# Patient Record
Sex: FEMALE | Race: WHITE | Employment: UNEMPLOYED | URBAN - METROPOLITAN AREA
[De-identification: names, ages, dates, MRNs, and addresses within clinical notes are randomized per-mention and may not be internally consistent; named-entity substitution may affect disease eponyms.]

---

## 2017-05-16 ENCOUNTER — HOSPITAL ENCOUNTER (EMERGENCY)
Age: 52
Discharge: HOME | End: 2017-05-16
Attending: EMERGENCY MEDICINE
Payer: COMMERCIAL

## 2017-05-16 ENCOUNTER — APPOINTMENT (OUTPATIENT)
Dept: CT IMAGING | Age: 52
End: 2017-05-16
Payer: COMMERCIAL

## 2017-05-16 VITALS
HEIGHT: 68 IN | BODY MASS INDEX: 37.89 KG/M2 | HEART RATE: 90 BPM | DIASTOLIC BLOOD PRESSURE: 90 MMHG | RESPIRATION RATE: 17 BRPM | WEIGHT: 250 LBS | TEMPERATURE: 97.7 F | OXYGEN SATURATION: 100 % | SYSTOLIC BLOOD PRESSURE: 140 MMHG

## 2017-05-16 DIAGNOSIS — S89.91XA RIGHT KNEE INJURY, INITIAL ENCOUNTER: Primary | ICD-10-CM

## 2017-05-16 PROCEDURE — 6360000002 HC RX W HCPCS: Performed by: EMERGENCY MEDICINE

## 2017-05-16 PROCEDURE — 73700 CT LOWER EXTREMITY W/O DYE: CPT

## 2017-05-16 PROCEDURE — 93971 EXTREMITY STUDY: CPT

## 2017-05-16 PROCEDURE — 99283 EMERGENCY DEPT VISIT LOW MDM: CPT

## 2017-05-16 PROCEDURE — 96372 THER/PROPH/DIAG INJ SC/IM: CPT

## 2017-05-16 RX ORDER — MORPHINE SULFATE 4 MG/ML
4 INJECTION, SOLUTION INTRAMUSCULAR; INTRAVENOUS ONCE
Status: COMPLETED | OUTPATIENT
Start: 2017-05-16 | End: 2017-05-16

## 2017-05-16 RX ORDER — BUPROPION HYDROCHLORIDE 75 MG/1
75 TABLET ORAL 2 TIMES DAILY
COMMUNITY
End: 2021-10-13

## 2017-05-16 RX ORDER — OXYCODONE HYDROCHLORIDE AND ACETAMINOPHEN 5; 325 MG/1; MG/1
1-2 TABLET ORAL EVERY 6 HOURS PRN
Qty: 11 TABLET | Refills: 0 | Status: SHIPPED | OUTPATIENT
Start: 2017-05-16 | End: 2017-05-23

## 2017-05-16 RX ORDER — FLUTICASONE PROPIONATE 50 MCG
1 SPRAY, SUSPENSION (ML) NASAL DAILY
COMMUNITY
End: 2021-10-13

## 2017-05-16 RX ORDER — BUSPIRONE HYDROCHLORIDE 10 MG/1
10 TABLET ORAL 3 TIMES DAILY
COMMUNITY
End: 2021-10-13

## 2017-05-16 RX ADMIN — MORPHINE SULFATE 4 MG: 4 INJECTION, SOLUTION INTRAMUSCULAR; INTRAVENOUS at 20:11

## 2017-05-16 RX ADMIN — MORPHINE SULFATE 4 MG: 4 INJECTION, SOLUTION INTRAMUSCULAR; INTRAVENOUS at 16:53

## 2017-05-16 ASSESSMENT — PAIN DESCRIPTION - FREQUENCY: FREQUENCY: CONTINUOUS

## 2017-05-16 ASSESSMENT — PAIN DESCRIPTION - LOCATION: LOCATION: LEG

## 2017-05-16 ASSESSMENT — PAIN DESCRIPTION - DESCRIPTORS: DESCRIPTORS: CONSTANT

## 2017-05-16 ASSESSMENT — PAIN SCALES - GENERAL
PAINLEVEL_OUTOF10: 8
PAINLEVEL_OUTOF10: 9
PAINLEVEL_OUTOF10: 10

## 2017-05-16 ASSESSMENT — PAIN DESCRIPTION - ORIENTATION: ORIENTATION: RIGHT

## 2017-05-16 ASSESSMENT — PAIN DESCRIPTION - PAIN TYPE: TYPE: ACUTE PAIN;CHRONIC PAIN

## 2017-06-01 ASSESSMENT — ENCOUNTER SYMPTOMS
BLOOD IN STOOL: 0
DIARRHEA: 0
VOMITING: 0
COLOR CHANGE: 1
SORE THROAT: 0
NAUSEA: 0
RHINORRHEA: 0
SHORTNESS OF BREATH: 0
COUGH: 0
CONSTIPATION: 0
ABDOMINAL PAIN: 0

## 2020-06-26 ENCOUNTER — HOSPITAL ENCOUNTER (EMERGENCY)
Age: 55
Discharge: HOME | End: 2020-06-26
Attending: EMERGENCY MEDICINE
Payer: OTHER MISCELLANEOUS

## 2020-06-26 ENCOUNTER — APPOINTMENT (OUTPATIENT)
Dept: CT IMAGING | Age: 55
End: 2020-06-26
Payer: OTHER MISCELLANEOUS

## 2020-06-26 ENCOUNTER — APPOINTMENT (OUTPATIENT)
Dept: GENERAL RADIOLOGY | Age: 55
End: 2020-06-26
Payer: OTHER MISCELLANEOUS

## 2020-06-26 VITALS
HEART RATE: 86 BPM | SYSTOLIC BLOOD PRESSURE: 140 MMHG | RESPIRATION RATE: 15 BRPM | DIASTOLIC BLOOD PRESSURE: 86 MMHG | OXYGEN SATURATION: 96 % | TEMPERATURE: 98 F

## 2020-06-26 LAB
ANION GAP SERPL CALCULATED.3IONS-SCNC: 13 MMOL/L (ref 9–17)
BUN BLDV-MCNC: 19 MG/DL (ref 6–20)
BUN/CREAT BLD: ABNORMAL (ref 9–20)
CALCIUM SERPL-MCNC: 8.7 MG/DL (ref 8.6–10.4)
CHLORIDE BLD-SCNC: 104 MMOL/L (ref 98–107)
CO2: 27 MMOL/L (ref 20–31)
CREAT SERPL-MCNC: 0.86 MG/DL (ref 0.5–0.9)
GFR AFRICAN AMERICAN: >60 ML/MIN
GFR NON-AFRICAN AMERICAN: >60 ML/MIN
GFR SERPL CREATININE-BSD FRML MDRD: ABNORMAL ML/MIN/{1.73_M2}
GFR SERPL CREATININE-BSD FRML MDRD: ABNORMAL ML/MIN/{1.73_M2}
GLUCOSE BLD-MCNC: 133 MG/DL (ref 70–99)
POTASSIUM SERPL-SCNC: 4 MMOL/L (ref 3.7–5.3)
SODIUM BLD-SCNC: 144 MMOL/L (ref 135–144)

## 2020-06-26 PROCEDURE — 72125 CT NECK SPINE W/O DYE: CPT

## 2020-06-26 PROCEDURE — 99285 EMERGENCY DEPT VISIT HI MDM: CPT

## 2020-06-26 PROCEDURE — 96375 TX/PRO/DX INJ NEW DRUG ADDON: CPT

## 2020-06-26 PROCEDURE — 80048 BASIC METABOLIC PNL TOTAL CA: CPT

## 2020-06-26 PROCEDURE — 71045 X-RAY EXAM CHEST 1 VIEW: CPT

## 2020-06-26 PROCEDURE — 73562 X-RAY EXAM OF KNEE 3: CPT

## 2020-06-26 PROCEDURE — 72128 CT CHEST SPINE W/O DYE: CPT

## 2020-06-26 PROCEDURE — 72131 CT LUMBAR SPINE W/O DYE: CPT

## 2020-06-26 PROCEDURE — 6370000000 HC RX 637 (ALT 250 FOR IP): Performed by: STUDENT IN AN ORGANIZED HEALTH CARE EDUCATION/TRAINING PROGRAM

## 2020-06-26 PROCEDURE — 70450 CT HEAD/BRAIN W/O DYE: CPT

## 2020-06-26 PROCEDURE — 96374 THER/PROPH/DIAG INJ IV PUSH: CPT

## 2020-06-26 PROCEDURE — 6360000002 HC RX W HCPCS: Performed by: STUDENT IN AN ORGANIZED HEALTH CARE EDUCATION/TRAINING PROGRAM

## 2020-06-26 RX ORDER — CYCLOBENZAPRINE HCL 10 MG
10 TABLET ORAL 3 TIMES DAILY PRN
Qty: 21 TABLET | Refills: 0 | Status: SHIPPED | OUTPATIENT
Start: 2020-06-26 | End: 2020-07-06

## 2020-06-26 RX ORDER — FENTANYL CITRATE 50 UG/ML
50 INJECTION, SOLUTION INTRAMUSCULAR; INTRAVENOUS ONCE
Status: COMPLETED | OUTPATIENT
Start: 2020-06-26 | End: 2020-06-26

## 2020-06-26 RX ORDER — ONDANSETRON 2 MG/ML
4 INJECTION INTRAMUSCULAR; INTRAVENOUS ONCE
Status: COMPLETED | OUTPATIENT
Start: 2020-06-26 | End: 2020-06-26

## 2020-06-26 RX ORDER — IBUPROFEN 600 MG/1
600 TABLET ORAL EVERY 6 HOURS PRN
Qty: 15 TABLET | Refills: 0 | Status: SHIPPED | OUTPATIENT
Start: 2020-06-26 | End: 2021-10-13

## 2020-06-26 RX ORDER — LIDOCAINE 50 MG/G
1 PATCH TOPICAL DAILY
Qty: 30 PATCH | Refills: 0 | Status: SHIPPED | OUTPATIENT
Start: 2020-06-26 | End: 2021-10-13

## 2020-06-26 RX ORDER — KETOROLAC TROMETHAMINE 15 MG/ML
15 INJECTION, SOLUTION INTRAMUSCULAR; INTRAVENOUS ONCE
Status: COMPLETED | OUTPATIENT
Start: 2020-06-26 | End: 2020-06-26

## 2020-06-26 RX ORDER — ACETAMINOPHEN 500 MG
1000 TABLET ORAL EVERY 6 HOURS PRN
Qty: 30 TABLET | Refills: 0 | Status: SHIPPED | OUTPATIENT
Start: 2020-06-26 | End: 2021-10-13

## 2020-06-26 RX ORDER — CYCLOBENZAPRINE HCL 10 MG
10 TABLET ORAL ONCE
Status: COMPLETED | OUTPATIENT
Start: 2020-06-26 | End: 2020-06-26

## 2020-06-26 RX ADMIN — ONDANSETRON 4 MG: 2 INJECTION INTRAMUSCULAR; INTRAVENOUS at 16:27

## 2020-06-26 RX ADMIN — KETOROLAC TROMETHAMINE 15 MG: 15 INJECTION, SOLUTION INTRAMUSCULAR; INTRAVENOUS at 17:16

## 2020-06-26 RX ADMIN — FENTANYL CITRATE 50 MCG: 50 INJECTION INTRAMUSCULAR; INTRAVENOUS at 16:28

## 2020-06-26 RX ADMIN — CYCLOBENZAPRINE 10 MG: 10 TABLET, FILM COATED ORAL at 18:28

## 2020-06-26 ASSESSMENT — PAIN SCALES - GENERAL
PAINLEVEL_OUTOF10: 10
PAINLEVEL_OUTOF10: 9

## 2020-06-26 ASSESSMENT — PAIN DESCRIPTION - ORIENTATION: ORIENTATION: LEFT;LOWER

## 2020-06-26 ASSESSMENT — PAIN DESCRIPTION - LOCATION: LOCATION: BACK;KNEE

## 2020-06-26 ASSESSMENT — PAIN DESCRIPTION - PAIN TYPE: TYPE: ACUTE PAIN;CHRONIC PAIN
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
## 2020-06-26 NOTE — ED NOTES
Pt. Reports a decrease of pain. Dr. Judy Alejandra and resident at bedside. Pt resting on stretcher, no respiratory distress noted, pt updated on plan of care, will continue to monitor, call light in reach.        Sushil Grimm RN  06/26/20 Sigifredo Paulino RN  06/26/20 [id number]

## 2020-06-26 NOTE — ED NOTES
Bed: 17  Expected date:   Expected time:   Means of arrival:   Comments:  [facility] [place],[address], RN  06/26/20 [id number]

## 2020-06-27 ASSESSMENT — ENCOUNTER SYMPTOMS
CHEST TIGHTNESS: 0
BACK PAIN: 1
SHORTNESS OF BREATH: 0
EYE DISCHARGE: 0
ABDOMINAL PAIN: 0
EYE REDNESS: 0
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
## 2021-10-12 ENCOUNTER — HOSPITAL ENCOUNTER (INPATIENT)
Age: 56
LOS: 3 days | Discharge: HOME | DRG: 305 | End: 2021-10-15
Attending: EMERGENCY MEDICINE | Admitting: STUDENT IN AN ORGANIZED HEALTH CARE EDUCATION/TRAINING PROGRAM
Payer: MEDICARE

## 2021-10-12 ENCOUNTER — APPOINTMENT (OUTPATIENT)
Dept: CT IMAGING | Age: 56
DRG: 305 | End: 2021-10-12
Payer: MEDICARE

## 2021-10-12 ENCOUNTER — APPOINTMENT (OUTPATIENT)
Dept: GENERAL RADIOLOGY | Age: 56
DRG: 305 | End: 2021-10-12
Payer: MEDICARE

## 2021-10-12 DIAGNOSIS — I16.0 HYPERTENSIVE URGENCY: ICD-10-CM

## 2021-10-12 DIAGNOSIS — R41.82 ALTERED MENTAL STATUS, UNSPECIFIED ALTERED MENTAL STATUS TYPE: Primary | ICD-10-CM

## 2021-10-12 DIAGNOSIS — G93.9 BRAIN LESION: ICD-10-CM

## 2021-10-12 LAB
ABSOLUTE EOS #: 0.04 K/UL (ref 0–0.44)
ABSOLUTE IMMATURE GRANULOCYTE: 0.11 K/UL (ref 0–0.3)
ABSOLUTE LYMPH #: 0.74 K/UL (ref 1.1–3.7)
ABSOLUTE MONO #: 0.38 K/UL (ref 0.1–1.2)
ALBUMIN SERPL-MCNC: 5.2 G/DL (ref 3.5–5.2)
ALBUMIN/GLOBULIN RATIO: 1.8 (ref 1–2.5)
ALP BLD-CCNC: 83 U/L (ref 35–104)
ALT SERPL-CCNC: 39 U/L (ref 5–33)
AMPHETAMINE SCREEN URINE: NEGATIVE
ANION GAP SERPL CALCULATED.3IONS-SCNC: 19 MMOL/L (ref 9–17)
AST SERPL-CCNC: 27 U/L
BARBITURATE SCREEN URINE: NEGATIVE
BASOPHILS # BLD: 0 % (ref 0–2)
BASOPHILS ABSOLUTE: 0.03 K/UL (ref 0–0.2)
BENZODIAZEPINE SCREEN, URINE: NEGATIVE
BILIRUB SERPL-MCNC: 0.61 MG/DL (ref 0.3–1.2)
BUN BLDV-MCNC: 14 MG/DL (ref 6–20)
BUN/CREAT BLD: ABNORMAL (ref 9–20)
BUPRENORPHINE URINE: ABNORMAL
CALCIUM SERPL-MCNC: 9.9 MG/DL (ref 8.6–10.4)
CANNABINOID SCREEN URINE: NEGATIVE
CHLORIDE BLD-SCNC: 97 MMOL/L (ref 98–107)
CO2: 21 MMOL/L (ref 20–31)
COCAINE METABOLITE, URINE: NEGATIVE
CREAT SERPL-MCNC: 0.84 MG/DL (ref 0.5–0.9)
DIFFERENTIAL TYPE: ABNORMAL
EOSINOPHILS RELATIVE PERCENT: 0 % (ref 1–4)
GFR AFRICAN AMERICAN: >60 ML/MIN
GFR NON-AFRICAN AMERICAN: >60 ML/MIN
GFR SERPL CREATININE-BSD FRML MDRD: ABNORMAL ML/MIN/{1.73_M2}
GFR SERPL CREATININE-BSD FRML MDRD: ABNORMAL ML/MIN/{1.73_M2}
GLUCOSE BLD-MCNC: 197 MG/DL (ref 70–99)
HCT VFR BLD CALC: 42.4 % (ref 36.3–47.1)
HEMOGLOBIN: 13.7 G/DL (ref 11.9–15.1)
IMMATURE GRANULOCYTES: 1 %
LYMPHOCYTES # BLD: 6 % (ref 24–43)
MCH RBC QN AUTO: 28.2 PG (ref 25.2–33.5)
MCHC RBC AUTO-ENTMCNC: 32.3 G/DL (ref 28.4–34.8)
MCV RBC AUTO: 87.4 FL (ref 82.6–102.9)
MDMA URINE: ABNORMAL
METHADONE SCREEN, URINE: NEGATIVE
METHAMPHETAMINE, URINE: ABNORMAL
MONOCYTES # BLD: 3 % (ref 3–12)
NRBC AUTOMATED: 0 PER 100 WBC
OPIATES, URINE: NEGATIVE
OXYCODONE SCREEN URINE: POSITIVE
PDW BLD-RTO: 13.5 % (ref 11.8–14.4)
PHENCYCLIDINE, URINE: NEGATIVE
PLATELET # BLD: 286 K/UL (ref 138–453)
PLATELET ESTIMATE: ABNORMAL
PMV BLD AUTO: 8.6 FL (ref 8.1–13.5)
POTASSIUM SERPL-SCNC: 3.9 MMOL/L (ref 3.7–5.3)
PROLACTIN: 8.76 UG/L (ref 4.79–23.3)
PROPOXYPHENE, URINE: ABNORMAL
RBC # BLD: 4.85 M/UL (ref 3.95–5.11)
RBC # BLD: ABNORMAL 10*6/UL
SEG NEUTROPHILS: 90 % (ref 36–65)
SEGMENTED NEUTROPHILS ABSOLUTE COUNT: 10.29 K/UL (ref 1.5–8.1)
SODIUM BLD-SCNC: 137 MMOL/L (ref 135–144)
TEST INFORMATION: ABNORMAL
TOTAL PROTEIN: 8.1 G/DL (ref 6.4–8.3)
TRICYCLIC ANTIDEPRESSANTS, UR: ABNORMAL
WBC # BLD: 11.6 K/UL (ref 3.5–11.3)
WBC # BLD: ABNORMAL 10*3/UL

## 2021-10-12 PROCEDURE — 2500000003 HC RX 250 WO HCPCS: Performed by: STUDENT IN AN ORGANIZED HEALTH CARE EDUCATION/TRAINING PROGRAM

## 2021-10-12 PROCEDURE — 71045 X-RAY EXAM CHEST 1 VIEW: CPT

## 2021-10-12 PROCEDURE — 70450 CT HEAD/BRAIN W/O DYE: CPT

## 2021-10-12 PROCEDURE — 84146 ASSAY OF PROLACTIN: CPT

## 2021-10-12 PROCEDURE — 80307 DRUG TEST PRSMV CHEM ANLYZR: CPT

## 2021-10-12 PROCEDURE — 93005 ELECTROCARDIOGRAM TRACING: CPT | Performed by: STUDENT IN AN ORGANIZED HEALTH CARE EDUCATION/TRAINING PROGRAM

## 2021-10-12 PROCEDURE — 2060000000 HC ICU INTERMEDIATE R&B

## 2021-10-12 PROCEDURE — 96365 THER/PROPH/DIAG IV INF INIT: CPT

## 2021-10-12 PROCEDURE — 96361 HYDRATE IV INFUSION ADD-ON: CPT

## 2021-10-12 PROCEDURE — 2580000003 HC RX 258: Performed by: STUDENT IN AN ORGANIZED HEALTH CARE EDUCATION/TRAINING PROGRAM

## 2021-10-12 PROCEDURE — 73630 X-RAY EXAM OF FOOT: CPT

## 2021-10-12 PROCEDURE — 80053 COMPREHEN METABOLIC PANEL: CPT

## 2021-10-12 PROCEDURE — 99283 EMERGENCY DEPT VISIT LOW MDM: CPT

## 2021-10-12 PROCEDURE — 85025 COMPLETE CBC W/AUTO DIFF WBC: CPT

## 2021-10-12 RX ORDER — 0.9 % SODIUM CHLORIDE 0.9 %
1000 INTRAVENOUS SOLUTION INTRAVENOUS ONCE
Status: COMPLETED | OUTPATIENT
Start: 2021-10-12 | End: 2021-10-12

## 2021-10-12 RX ORDER — NICARDIPINE HYDROCHLORIDE 0.1 MG/ML
3-15 INJECTION INTRAVENOUS CONTINUOUS
Status: DISCONTINUED | OUTPATIENT
Start: 2021-10-12 | End: 2021-10-14

## 2021-10-12 RX ADMIN — NICARDIPINE HYDROCHLORIDE 3 MG/HR: 0.1 INJECTION INTRAVENOUS at 21:57

## 2021-10-12 RX ADMIN — SODIUM CHLORIDE 1000 ML: 9 INJECTION, SOLUTION INTRAVENOUS at 19:59

## 2021-10-12 ASSESSMENT — ENCOUNTER SYMPTOMS
NAUSEA: 0
RHINORRHEA: 0
VOMITING: 0
SHORTNESS OF BREATH: 0
ABDOMINAL PAIN: 0
SORE THROAT: 0
DIARRHEA: 0

## 2021-10-12 NOTE — ED PROVIDER NOTES
[facility] ED  Emergency Department Encounter  EmergencyMedicine Resident     Pt Orlando Lewis  MRN: [id number]  Josiasgfbrandie 1965  Date of evaluation: 10/12/21  PCP:  Denise Horan MD    This patient was evaluated in the Emergency Department for symptoms described in the history of present illness. The patient was evaluated in the context of the global COVID-19 pandemic, which necessitated consideration that the patient might be at risk for infection with the SARS-CoV-2 virus that causes COVID-19. Institutional protocols and algorithms that pertain to the evaluation of patients at risk for COVID-19 are in a state of rapid change based on information released by regulatory bodies including the CDC and federal and state organizations. These policies and algorithms were followed during the patient's care in the ED. CHIEF COMPLAINT       Chief Complaint   Patient presents with    Altered Mental Status     LKW 1 hour pta       HISTORY OF PRESENT ILLNESS  (Location/Symptom, Timing/Onset, Context/Setting, Quality, Duration, Modifying Factors, Severity.)      Colton De León is a 55 y.o. female who presents with acute altered mental status concerning for possible seizure. Patient last known well was 6:30 PM today patient's  went to the store came back and she was found altered evidence of tongue biting with some dried blood in oral cavity. Patient does not recall what happened no history of seizures EMS was called hypertensive to the 190s over 100s this rhythm stable but postictal during transfer. No history of seizures. Patient was seen yesterday for elevated blood pressure and headache at an outside facility emergency department. Recently had a surgical procedure to the right great toe she does not recall exactly what the procedure was.     PAST MEDICAL / SURGICAL / SOCIAL / FAMILY HISTORY      has a past medical history of Asthma, Borderline diabetes, Depression, Fibromyalgia, Herpes simplex without mention of complication, and Sleep apnea. has a past surgical history that includes Cholecystectomy (1990); Tonsillectomy; Tubal ligation (1992); back surgery (Cage and Screws); Foot Tendon Surgery; Dilation & curettage (11/14/11); hysteroscopy (11/14/11); kanu and bso (cervix removed) (12/29/11); Hysterectomy (12/29/11); Enterocele repair (12/29/11); Abdominal adhesion surgery (12/29/11); and back surgery (2/24/12). Social History     Socioeconomic History    Marital status:      Spouse name: Not on file    Number of children: Not on file    Years of education: Not on file    Highest education level: Not on file   Occupational History    Not on file   Tobacco Use    Smoking status: Never Smoker    Smokeless tobacco: Never Used   Substance and Sexual Activity    Alcohol use: Yes     Comment: Rare    Drug use: No    Sexual activity: Not Currently     Partners: Male   Other Topics Concern    Not on file   Social History Narrative    Not on file     Social Determinants of Health     Financial Resource Strain:     Difficulty of Paying Living Expenses:    Food Insecurity:     Worried About Running Out of Food in the Last Year:     [address] in the Last Year:    Transportation Needs:     Lack of Transportation (Medical):      Lack of Transportation (Non-Medical):    Physical Activity:     Days of Exercise per Week:     Minutes of Exercise per Session:    Stress:     Feeling of Stress :    Social Connections:     Frequency of Communication with Friends and Family:     Frequency of Social Gatherings with Friends and Family:     Attends Hinduism Services:     Active Member of Clubs or Organizations:     Attends Club or Organization Meetings:     Marital Status:    Intimate Partner Violence:     Fear of Current or Ex-Partner:     Emotionally Abused:     Physically Abused:     Sexually Abused:        Family History   Problem Relation Age of Onset    Depression Mother     ADHD Neg Hx     Alcohol Abuse Neg Hx     Allergic Rhinitis Neg Hx     Allergies Neg Hx     Allergy (Severe) Neg Hx     Alzheimer's Disease Neg Hx     Amblyopia Neg Hx     Anemia Neg Hx     Anesth Problems Neg Hx     Angioedema Neg Hx     Anxiety Disorder Neg Hx     Arrhythmia Neg Hx     Arthritis Neg Hx     Asthma Neg Hx     Ataxia Neg Hx     Atopy Neg Hx     Bipolar Disorder Neg Hx     Birth Defects Neg Hx     Bleeding Prob Neg Hx     Blindness Neg Hx     Brain Cancer Neg Hx     BRCA 1/2 Neg Hx     Breast Cancer Neg Hx     Broken Bones Neg Hx     Cancer Neg Hx     Cataracts Neg Hx     Celiac Disease Neg Hx     Cervical Cancer Neg Hx     Chdhd Hrt Surg Neg Hx     Chdhd Resp Dis Neg Hx     Chorea Neg Hx     Chronic Infections Neg Hx     Cirrhosis Neg Hx     Clotting Disorder Neg Hx     Collagen Disease Neg Hx     Colon Cancer Neg Hx     Colon Polyps Neg Hx     COPD Neg Hx     Coronary Art Dis Neg Hx     Cowden Syndrome Neg Hx     Crohn's Disease Neg Hx     Cystic Fibrosis Neg Hx     Dementia Neg Hx     JONATHAN Usage Neg Hx     Diabetes Neg Hx     Dislocations Neg Hx     Down Syndrome Neg Hx     Drug Abuse Neg Hx     Early Death Neg Hx     Eclampsia Neg Hx     Eczema Neg Hx     Elevated Lipids Neg Hx     Emphysema Neg Hx     Endometrial Cancer Neg Hx     Esophageal Cancer Neg Hx     Fainting Neg Hx     Glaucoma Neg Hx      Problems Neg Hx     Hearing Loss Neg Hx     Heart Attack Neg Hx     Heart Defect Neg Hx     Heart Disease Neg Hx     Heart Failure Neg Hx     Heart Surgery Neg Hx     Hemochromatosis Neg Hx     High Blood Pressure Neg Hx     High Cholesterol Neg Hx     Hypertension Neg Hx     Hypotension Neg Hx     Immunodeficiency Neg Hx     Infertility Neg Hx     Inflam Bowel Dis Neg Hx     Irritable Bowel Syndrome Neg Hx     Kidney Cancer Neg Hx     Kidney Disease Neg Hx     Learning Disabilities Neg Hx     Li-Fraumeni Syndrome Neg Hx     Liver Cancer Neg Hx     Liver Disease Neg Hx     Lung Cancer Neg Hx     Lupus Neg Hx     Macular Degen Neg Hx     Malig Hyperten Neg Hx     Malig Hypertherm Neg Hx     Marfan Syndrome Neg Hx     Memory Loss Neg Hx     Mental Illness Neg Hx     Mental Retardation Neg Hx     Migraines Neg Hx     Miscarriages / Stillbirths Neg Hx     Mult Sclerosis Neg Hx     Neurofibromatosis Neg Hx     Neuropathy Neg Hx     Obesity Neg Hx     OCD Neg Hx     Osteoarthritis Neg Hx     Osteoporosis Neg Hx     Other Neg Hx     Ovarian Cancer Neg Hx     Pacemaker Neg Hx     Paranoid Behavior Neg Hx     Parkinsonism Neg Hx     Physical Abuse Neg Hx     PKU Neg Hx     Premature Birth Neg Hx      Labor Neg Hx     Prostate Cancer Neg Hx     Pseudochol. Deficiency Neg Hx     Psoriasis Neg Hx     Rashes/Skin Problems Neg Hx     Rectal Cancer Neg Hx     Retinal Detachment Neg Hx     Rheum Arthritis Neg Hx     Rheumatologic Disease Neg Hx     Schizophrenia Neg Hx     Scoliosis Neg Hx     Seizures Neg Hx     Severe Sprains Neg Hx     Sexual Abuse Neg Hx     Sickle Cell Anemia Neg Hx     Sickle Cell Trait Neg Hx     SIDS Neg Hx     Spont Abortions Neg Hx     Stillborn Neg Hx     Stomach Cancer Neg Hx     Strabismus Neg Hx     Stroke Neg Hx     Substance Abuse Neg Hx     Sudden Death Neg Hx     Thyroid Cancer Neg Hx     Thyroid Disease Neg Hx     Tuberculosis Neg Hx     Ulcerative Colitis Neg Hx     Urolithiasis Neg Hx     Urticaria Neg Hx     Uterine Cancer Neg Hx     Vaginal Cancer Neg Hx     Vision Loss Neg Hx     Farooq's Disease Neg Hx        Allergies:  Bactrim, Demerol, Lyrica [pregabalin], and Pcn [penicillins]    Home Medications:  Prior to Admission medications    Medication Sig Start Date End Date Taking?  Authorizing Provider   acetaminophen (APAP EXTRA STRENGTH) 500 MG tablet Take 2 tablets by mouth every 6 hours as needed for Pain 6/26/20   Jeremy Huang DO   ibuprofen (ADVIL;MOTRIN) 600 MG tablet Take 1 tablet by mouth every 6 hours as needed for Pain 6/26/20   Jeremy Huang DO   lidocaine (LIDODERM) 5 % Place 1 patch onto the skin daily 12 hours on, 12 hours off. 6/26/20   Jeremy Huang DO   buPROPion (WELLBUTRIN) 75 MG tablet Take 75 mg by mouth 2 times daily    Historical Provider, MD   busPIRone (BUSPAR) 10 MG tablet Take 10 mg by mouth 3 times daily    Historical Provider, MD   fluticasone (FLONASE) 50 MCG/ACT nasal spray 1 spray by Nasal route daily    Historical Provider, MD   fluoxetine (PROZAC) 20 MG capsule Take 20 mg by mouth daily. 11/23/11   Historical Provider, MD   lisinopril (PRINIVIL;ZESTRIL) 20 MG tablet Take 20 mg by mouth daily. Historical Provider, MD   Albuterol Sulfate (PROAIR HFA IN) Inhale 2 puffs into the lungs as needed. Historical Provider, MD       REVIEW OF SYSTEMS    (2-9 systems for level 4, 10 or more for level 5)      Review of Systems   Constitutional: Negative for fever. HENT: Negative for congestion, rhinorrhea and sore throat. Eyes: Negative for visual disturbance. Respiratory: Negative for shortness of breath. Cardiovascular: Negative for chest pain. Gastrointestinal: Negative for abdominal pain, diarrhea, nausea and vomiting. Genitourinary: Negative for difficulty urinating, dysuria and flank pain. Musculoskeletal: Negative for neck pain. Skin: Positive for wound. Allergic/Immunologic: Negative for food allergies. Neurological: Positive for seizures. Negative for numbness and headaches. Psychiatric/Behavioral: Positive for confusion. PHYSICAL EXAM   (up to 7 for level 4, 8 or more for level 5)      INITIAL VITALS:   BP (!) 191/113   Pulse 80   Temp 98.1 °F (36.7 °C) (Oral)   Resp 22   LMP 11/29/2011   SpO2 93%     Physical Exam  Vitals and nursing note reviewed. Constitutional:       Appearance: She is well-developed. She is obese.  She is not toxic-appearing or diaphoretic. HENT:      Head: Normocephalic. Right Ear: External ear normal.      Left Ear: External ear normal.      Nose: Nose normal.      Mouth/Throat:      Mouth: Mucous membranes are moist.   Eyes:      General: No scleral icterus. Extraocular Movements: Extraocular movements intact. Pupils: Pupils are equal, round, and reactive to light. Cardiovascular:      Rate and Rhythm: Normal rate. Pulses: Normal pulses. Pulmonary:      Effort: Pulmonary effort is normal. No respiratory distress. Abdominal:      Palpations: Abdomen is soft. Musculoskeletal:         General: Tenderness and signs of injury present. Normal range of motion. Cervical back: Normal range of motion and neck supple. Comments: Right great toe   Skin:     General: Skin is warm. Capillary Refill: Capillary refill takes less than 2 seconds. Neurological:      General: No focal deficit present. Mental Status: She is alert.    Psychiatric:         Mood and Affect: Mood normal.         DIFFERENTIAL  DIAGNOSIS     PLAN (LABS / IMAGING / EKG):  Orders Placed This Encounter   Procedures    XR CHEST PORTABLE    CT HEAD WO CONTRAST    XR FOOT RIGHT (MIN 3 VIEWS)    CBC WITH AUTO DIFFERENTIAL    COMPREHENSIVE METABOLIC PANEL    PROLACTIN    Urine Drug Screen    Inpatient consult to Neurocritical care    Inpatient consult to Neurology    Inpatient consult to Hospitalist    EKG 12 Lead       MEDICATIONS ORDERED:  Orders Placed This Encounter   Medications    0.9 % sodium chloride bolus    niCARdipine (CARDENE) 20 mg in 0.9 % sodium chloride 200 mL solution       DDX: seizure, htn encephalopathy    DIAGNOSTIC RESULTS / EMERGENCY DEPARTMENT COURSE / MDM   LAB RESULTS:  Results for orders placed or performed during the hospital encounter of 10/12/21   CBC WITH AUTO DIFFERENTIAL   Result Value Ref Range    WBC 11.6 (H) 3.5 - 11.3 k/uL    RBC 4.85 3.95 - 5.11 m/uL    Hemoglobin 13.7 11.9 - 15.1 g/dL    Hematocrit 42.4 36.3 - 47.1 %    MCV 87.4 82.6 - 102.9 fL    MCH 28.2 25.2 - 33.5 pg    MCHC 32.3 28.4 - 34.8 g/dL    RDW 13.5 11.8 - 14.4 %    Platelets 642 830 - 673 k/uL    MPV 8.6 8.1 - 13.5 fL    NRBC Automated 0.0 0.0 per 100 WBC    Differential Type NOT REPORTED     Seg Neutrophils 90 (H) 36 - 65 %    Lymphocytes 6 (L) 24 - 43 %    Monocytes 3 3 - 12 %    Eosinophils % 0 (L) 1 - 4 %    Basophils 0 0 - 2 %    Immature Granulocytes 1 (H) 0 %    Segs Absolute 10.29 (H) 1.50 - 8.10 k/uL    Absolute Lymph # 0.74 (L) 1.10 - 3.70 k/uL    Absolute Mono # 0.38 0.10 - 1.20 k/uL    Absolute Eos # 0.04 0.00 - 0.44 k/uL    Basophils Absolute 0.03 0.00 - 0.20 k/uL    Absolute Immature Granulocyte 0.11 0.00 - 0.30 k/uL    WBC Morphology NOT REPORTED     RBC Morphology NOT REPORTED     Platelet Estimate NOT REPORTED    COMPREHENSIVE METABOLIC PANEL   Result Value Ref Range    Glucose 197 (H) 70 - 99 mg/dL    BUN 14 6 - 20 mg/dL    CREATININE 0.84 0.50 - 0.90 mg/dL    Bun/Cre Ratio NOT REPORTED 9 - 20    Calcium 9.9 8.6 - 10.4 mg/dL    Sodium 137 135 - 144 mmol/L    Potassium 3.9 3.7 - 5.3 mmol/L    Chloride 97 (L) 98 - 107 mmol/L    CO2 21 20 - 31 mmol/L    Anion Gap 19 (H) 9 - 17 mmol/L    Alkaline Phosphatase 83 35 - 104 U/L    ALT 39 (H) 5 - 33 U/L    AST 27 <32 U/L    Total Bilirubin 0.61 0.3 - 1.2 mg/dL    Total Protein 8.1 6.4 - 8.3 g/dL    Albumin 5.2 3.5 - 5.2 g/dL    Albumin/Globulin Ratio 1.8 1.0 - 2.5    GFR Non-African American >60 >60 mL/min    GFR African American >60 >60 mL/min    GFR Comment          GFR Staging NOT REPORTED    PROLACTIN   Result Value Ref Range    Prolactin 8.76 4.79 - 23.30 ug/L   Urine Drug Screen   Result Value Ref Range    Amphetamine Screen, Ur NEGATIVE NEGATIVE    Barbiturate Screen, Ur NEGATIVE NEGATIVE    Benzodiazepine Screen, Urine NEGATIVE NEGATIVE    Cocaine Metabolite, Urine NEGATIVE NEGATIVE    Methadone Screen, Urine NEGATIVE NEGATIVE    Opiates, Urine NEGATIVE NEGATIVE    Phencyclidine, Urine NEGATIVE NEGATIVE    Propoxyphene, Urine NOT REPORTED NEGATIVE    Cannabinoid Scrn, Ur NEGATIVE NEGATIVE    Oxycodone Screen, Ur POSITIVE (A) NEGATIVE    Methamphetamine, Urine NOT REPORTED NEGATIVE    Tricyclic Antidepressants, Urine NOT REPORTED NEGATIVE    MDMA, Urine NOT REPORTED NEGATIVE    Buprenorphine Urine NOT REPORTED NEGATIVE    Test Information       Assay provides medical screening only. The absence of expected drug(s) and/or metabolite(s) may indicate diluted or adulterated urine, limitations of testing or timing of collection. IMPRESSION: Is a post ictal obese 59-year-old female hypertensive history of seizures plan will be broad work-up including CT head x-ray right lower extremity    RADIOLOGY:  XR FOOT RIGHT (MIN 3 VIEWS)    Result Date: 10/12/2021  EXAMINATION: THREE X-RAY VIEWS OF THE RIGHT FOOT 10/12/2021 7:50 pm COMPARISON: None. HISTORY: ORDERING SYSTEM PROVIDED HISTORY: wound eval TECHNOLOGIST PROVIDED HISTORY: wound eval FINDINGS: There is evidence of previous surgery at the distal aspect of the right 1st metatarsal.  There is a suspected osteotomy with fixation screw noted, suggesting bunionectomy. However, the distal fragment of the osteotomy/1st metatarsal head is laterally displaced and dislocated relative to the base of the 1st proximal phalanx. No other acute fracture or dislocation. Joint spaces are preserved. Status post suspected bunionectomy with displaced distal osteotomy fragment; the 1st metatarsal head fragment is laterally dislocated relative to the 1st proximal phalangeal base.      CT HEAD WO CONTRAST    Result Date: 10/12/2021  EXAMINATION: CT OF THE HEAD WITHOUT CONTRAST  10/12/2021 8:41 pm TECHNIQUE: CT of the head was performed without the administration of intravenous contrast. Dose modulation, iterative reconstruction, and/or weight based adjustment of the mA/kV was utilized to reduce the radiation dose to as low as reasonably achievable. COMPARISON: CT head 06/26/2020 HISTORY: ORDERING SYSTEM PROVIDED HISTORY: new onset seizure TECHNOLOGIST PROVIDED HISTORY: new onset seizure Decision Support Exception - unselect if not a suspected or confirmed emergency medical condition->Emergency Medical Condition (MA) Is the patient pregnant?->No Reason for Exam: Altered Mental Status - New Onset Seizure. Recent Right foot surgery (1st Digit). Acuity: Unknown Type of Exam: Unknown FINDINGS: BRAIN/VENTRICLES: There is an ill-defined area of vague hypodensity seen in the left medial occipital lobe. This was not present on the prior study. There is no acute intracranial hemorrhage, significant mass effect or midline shift. No abnormal extra-axial fluid collection. There is no evidence of hydrocephalus. Atherosclerotic vascular calcifications of the vertebral arteries noted ORBITS: The visualized portion of the orbits demonstrate no acute abnormality. SINUSES: The visualized paranasal sinuses and mastoid air cells demonstrate no acute abnormality. SOFT TISSUES/SKULL:  No acute abnormality of the visualized skull or soft tissues. Ill-defined area of vague hypodensity noted in the left occipital lobe. Given the history of new onset seizure, evaluation with contrast-enhanced CT or MRI would be helpful. XR CHEST PORTABLE    Result Date: 10/12/2021  EXAMINATION: ONE XRAY VIEW OF THE CHEST 10/12/2021 7:50 pm COMPARISON: June 26, 2020 HISTORY: ORDERING SYSTEM PROVIDED HISTORY: Altered mental status TECHNOLOGIST PROVIDED HISTORY: ams Reason for Exam: port Upright FINDINGS: Cardiomediastinal silhouette within normal limits. Prominent interstitial markings similar to prior study. No focal consolidation. No definite effusion. No pneumothorax or subdiaphragmatic free air. No acute osseous abnormality identified. No radiographic evidence of acute cardiopulmonary disease.        EKG  sinus rhythm at a rate of 79 normal axis sinus arrhythmia normal R wave progression  respiration 80 MO interval 154. No st/t wave changes. All EKG's are interpreted by the Emergency Department Physician who either signs or Co-signs this chart in the absence of a cardiologist.    EMERGENCY DEPARTMENT COURSE:  ED Course as of Oct 12 2327   Tue Oct 12, 2021   2235 Case discussed with neurology resident as patient evaluated by neuro critical care resident and found not to be candidate for [facility] [place].    [BG]   [phone] for admission eeg, mri keppra load    [BG]   2310 Neuro residents and evaluated this patient recommending Inter[facility] admission with consultation to neurology. [BG]      ED Course User Index  [BG] Samantha Anand DO         PROCEDURES:    CONSULTS:  IP CONSULT TO NEUROCRITICAL CARE  IP CONSULT TO NEUROLOGY  IP CONSULT TO HOSPITALIST    CRITICAL CARE:      FINAL IMPRESSION      1. Altered mental status, unspecified altered mental status type    2. Hypertensive urgency          DISPOSITION / PLAN     DISPOSITION  admitted to intermed      PATIENT REFERRED TO:  No follow-up provider specified.     DISCHARGE MEDICATIONS:  New Prescriptions    No medications on file       Samantha Anand DO  Emergency Medicine Resident    (Please note that portions of thisnote were completed with a voice recognition program.  Efforts were made to edit the dictations but occasionally words are mis-transcribed.)       Samantha Anand DO  Resident  10/12/21 [id number]

## 2021-10-12 NOTE — ED NOTES
Bed: 30  Expected date:   Expected time:   Means of arrival:   Comments:  [address], RN  10/12/21 [id number]

## 2021-10-13 PROBLEM — E87.6 HYPOKALEMIA: Status: ACTIVE | Noted: 2021-10-13

## 2021-10-13 PROBLEM — R56.9 SEIZURE-LIKE ACTIVITY (HCC): Status: ACTIVE | Noted: 2021-10-13

## 2021-10-13 PROBLEM — I10 ESSENTIAL HYPERTENSION: Status: ACTIVE | Noted: 2021-10-13

## 2021-10-13 LAB
ALBUMIN SERPL-MCNC: 4.6 G/DL (ref 3.5–5.2)
ALBUMIN/GLOBULIN RATIO: 2.1 (ref 1–2.5)
ALP BLD-CCNC: 68 U/L (ref 35–104)
ALT SERPL-CCNC: 31 U/L (ref 5–33)
ANION GAP SERPL CALCULATED.3IONS-SCNC: 14 MMOL/L (ref 9–17)
AST SERPL-CCNC: 19 U/L
BILIRUB SERPL-MCNC: 0.49 MG/DL (ref 0.3–1.2)
BUN BLDV-MCNC: 14 MG/DL (ref 6–20)
BUN/CREAT BLD: ABNORMAL (ref 9–20)
CALCIUM SERPL-MCNC: 8.9 MG/DL (ref 8.6–10.4)
CHLORIDE BLD-SCNC: 98 MMOL/L (ref 98–107)
CO2: 25 MMOL/L (ref 20–31)
CREAT SERPL-MCNC: 0.8 MG/DL (ref 0.5–0.9)
EKG ATRIAL RATE: 79 BPM
EKG P AXIS: 50 DEGREES
EKG P-R INTERVAL: 154 MS
EKG Q-T INTERVAL: 398 MS
EKG QRS DURATION: 80 MS
EKG QTC CALCULATION (BAZETT): 456 MS
EKG R AXIS: -7 DEGREES
EKG T AXIS: 40 DEGREES
EKG VENTRICULAR RATE: 79 BPM
GFR AFRICAN AMERICAN: >60 ML/MIN
GFR NON-AFRICAN AMERICAN: >60 ML/MIN
GFR SERPL CREATININE-BSD FRML MDRD: ABNORMAL ML/MIN/{1.73_M2}
GFR SERPL CREATININE-BSD FRML MDRD: ABNORMAL ML/MIN/{1.73_M2}
GLUCOSE BLD-MCNC: 133 MG/DL (ref 70–99)
HCT VFR BLD CALC: 37.9 % (ref 36.3–47.1)
HEMOGLOBIN: 12 G/DL (ref 11.9–15.1)
INR BLD: 1.1
MAGNESIUM: 2.2 MG/DL (ref 1.6–2.6)
MCH RBC QN AUTO: 27.8 PG (ref 25.2–33.5)
MCHC RBC AUTO-ENTMCNC: 31.7 G/DL (ref 28.4–34.8)
MCV RBC AUTO: 87.9 FL (ref 82.6–102.9)
NRBC AUTOMATED: 0 PER 100 WBC
PDW BLD-RTO: 13.6 % (ref 11.8–14.4)
PLATELET # BLD: 243 K/UL (ref 138–453)
PMV BLD AUTO: 8.3 FL (ref 8.1–13.5)
POTASSIUM SERPL-SCNC: 3.5 MMOL/L (ref 3.7–5.3)
PROTHROMBIN TIME: 11.2 SEC (ref 9.1–12.3)
RBC # BLD: 4.31 M/UL (ref 3.95–5.11)
SARS-COV-2, RAPID: NOT DETECTED
SODIUM BLD-SCNC: 137 MMOL/L (ref 135–144)
SPECIMEN DESCRIPTION: NORMAL
TOTAL PROTEIN: 6.8 G/DL (ref 6.4–8.3)
WBC # BLD: 9.9 K/UL (ref 3.5–11.3)

## 2021-10-13 PROCEDURE — 6370000000 HC RX 637 (ALT 250 FOR IP): Performed by: INTERNAL MEDICINE

## 2021-10-13 PROCEDURE — 6360000002 HC RX W HCPCS: Performed by: NURSE PRACTITIONER

## 2021-10-13 PROCEDURE — 2060000000 HC ICU INTERMEDIATE R&B

## 2021-10-13 PROCEDURE — 6370000000 HC RX 637 (ALT 250 FOR IP): Performed by: NURSE PRACTITIONER

## 2021-10-13 PROCEDURE — 83735 ASSAY OF MAGNESIUM: CPT

## 2021-10-13 PROCEDURE — 2500000003 HC RX 250 WO HCPCS: Performed by: NURSE PRACTITIONER

## 2021-10-13 PROCEDURE — 99223 1ST HOSP IP/OBS HIGH 75: CPT | Performed by: INTERNAL MEDICINE

## 2021-10-13 PROCEDURE — 87635 SARS-COV-2 COVID-19 AMP PRB: CPT

## 2021-10-13 PROCEDURE — 2580000003 HC RX 258: Performed by: NURSE PRACTITIONER

## 2021-10-13 PROCEDURE — 36415 COLL VENOUS BLD VENIPUNCTURE: CPT

## 2021-10-13 PROCEDURE — 85027 COMPLETE CBC AUTOMATED: CPT

## 2021-10-13 PROCEDURE — 2580000003 HC RX 258: Performed by: STUDENT IN AN ORGANIZED HEALTH CARE EDUCATION/TRAINING PROGRAM

## 2021-10-13 PROCEDURE — 6360000002 HC RX W HCPCS: Performed by: STUDENT IN AN ORGANIZED HEALTH CARE EDUCATION/TRAINING PROGRAM

## 2021-10-13 PROCEDURE — 80053 COMPREHEN METABOLIC PANEL: CPT

## 2021-10-13 PROCEDURE — 85610 PROTHROMBIN TIME: CPT

## 2021-10-13 RX ORDER — SODIUM CHLORIDE 9 MG/ML
INJECTION, SOLUTION INTRAVENOUS CONTINUOUS
Status: DISCONTINUED | OUTPATIENT
Start: 2021-10-13 | End: 2021-10-13

## 2021-10-13 RX ORDER — TRAZODONE HYDROCHLORIDE 100 MG/1
100 TABLET ORAL NIGHTLY
Status: DISCONTINUED | OUTPATIENT
Start: 2021-10-13 | End: 2021-10-15 | Stop reason: HOSPADM

## 2021-10-13 RX ORDER — LAMOTRIGINE 150 MG/1
300 TABLET ORAL DAILY
COMMUNITY

## 2021-10-13 RX ORDER — POLYETHYLENE GLYCOL 3350 17 G/17G
17 POWDER, FOR SOLUTION ORAL DAILY PRN
Status: DISCONTINUED | OUTPATIENT
Start: 2021-10-13 | End: 2021-10-15 | Stop reason: HOSPADM

## 2021-10-13 RX ORDER — LISINOPRIL AND HYDROCHLOROTHIAZIDE 25; 20 MG/1; MG/1
1 TABLET ORAL DAILY
Status: DISCONTINUED | OUTPATIENT
Start: 2021-10-13 | End: 2021-10-15 | Stop reason: HOSPADM

## 2021-10-13 RX ORDER — HYDROCODONE BITARTRATE AND ACETAMINOPHEN 5; 325 MG/1; MG/1
1 TABLET ORAL ONCE
Status: COMPLETED | OUTPATIENT
Start: 2021-10-13 | End: 2021-10-13

## 2021-10-13 RX ORDER — LAMOTRIGINE 100 MG/1
300 TABLET ORAL DAILY
Status: DISCONTINUED | OUTPATIENT
Start: 2021-10-13 | End: 2021-10-15 | Stop reason: HOSPADM

## 2021-10-13 RX ORDER — ACETAMINOPHEN 325 MG/1
650 TABLET ORAL EVERY 6 HOURS PRN
Status: DISCONTINUED | OUTPATIENT
Start: 2021-10-13 | End: 2021-10-15 | Stop reason: HOSPADM

## 2021-10-13 RX ORDER — ACETAMINOPHEN 650 MG/1
650 SUPPOSITORY RECTAL EVERY 6 HOURS PRN
Status: DISCONTINUED | OUTPATIENT
Start: 2021-10-13 | End: 2021-10-15 | Stop reason: HOSPADM

## 2021-10-13 RX ORDER — LISINOPRIL AND HYDROCHLOROTHIAZIDE 25; 20 MG/1; MG/1
1 TABLET ORAL DAILY
COMMUNITY

## 2021-10-13 RX ORDER — LISINOPRIL 10 MG/1
20 TABLET ORAL DAILY
Status: DISCONTINUED | OUTPATIENT
Start: 2021-10-13 | End: 2021-10-13

## 2021-10-13 RX ORDER — CETIRIZINE HYDROCHLORIDE 10 MG/1
10 TABLET ORAL DAILY
Status: DISCONTINUED | OUTPATIENT
Start: 2021-10-13 | End: 2021-10-15 | Stop reason: HOSPADM

## 2021-10-13 RX ORDER — LORAZEPAM 2 MG/ML
2 INJECTION INTRAMUSCULAR EVERY 4 HOURS PRN
Status: DISCONTINUED | OUTPATIENT
Start: 2021-10-13 | End: 2021-10-13 | Stop reason: ALTCHOICE

## 2021-10-13 RX ORDER — SODIUM CHLORIDE 0.9 % (FLUSH) 0.9 %
5-40 SYRINGE (ML) INJECTION PRN
Status: DISCONTINUED | OUTPATIENT
Start: 2021-10-13 | End: 2021-10-15 | Stop reason: HOSPADM

## 2021-10-13 RX ORDER — SODIUM CHLORIDE 0.9 % (FLUSH) 0.9 %
5-40 SYRINGE (ML) INJECTION EVERY 12 HOURS SCHEDULED
Status: DISCONTINUED | OUTPATIENT
Start: 2021-10-13 | End: 2021-10-15 | Stop reason: HOSPADM

## 2021-10-13 RX ORDER — HYDROCODONE BITARTRATE AND ACETAMINOPHEN 5; 325 MG/1; MG/1
1 TABLET ORAL EVERY 4 HOURS PRN
Status: DISCONTINUED | OUTPATIENT
Start: 2021-10-13 | End: 2021-10-15 | Stop reason: HOSPADM

## 2021-10-13 RX ORDER — DULOXETIN HYDROCHLORIDE 30 MG/1
120 CAPSULE, DELAYED RELEASE ORAL EVERY EVENING
Status: DISCONTINUED | OUTPATIENT
Start: 2021-10-13 | End: 2021-10-15 | Stop reason: HOSPADM

## 2021-10-13 RX ORDER — LORATADINE 10 MG/1
10 TABLET ORAL DAILY
COMMUNITY

## 2021-10-13 RX ORDER — SODIUM CHLORIDE 9 MG/ML
25 INJECTION, SOLUTION INTRAVENOUS PRN
Status: DISCONTINUED | OUTPATIENT
Start: 2021-10-13 | End: 2021-10-15 | Stop reason: HOSPADM

## 2021-10-13 RX ORDER — BENZTROPINE MESYLATE 0.5 MG/1
0.5 TABLET ORAL 2 TIMES DAILY
COMMUNITY

## 2021-10-13 RX ORDER — TRAZODONE HYDROCHLORIDE 100 MG/1
100 TABLET ORAL NIGHTLY
COMMUNITY

## 2021-10-13 RX ORDER — PROPRANOLOL HYDROCHLORIDE 10 MG/1
10 TABLET ORAL 2 TIMES DAILY
Status: DISCONTINUED | OUTPATIENT
Start: 2021-10-13 | End: 2021-10-15 | Stop reason: HOSPADM

## 2021-10-13 RX ORDER — ALLOPURINOL 300 MG/1
300 TABLET ORAL DAILY
Status: DISCONTINUED | OUTPATIENT
Start: 2021-10-13 | End: 2021-10-15 | Stop reason: HOSPADM

## 2021-10-13 RX ORDER — DULOXETIN HYDROCHLORIDE 60 MG/1
120 CAPSULE, DELAYED RELEASE ORAL EVERY EVENING
COMMUNITY

## 2021-10-13 RX ORDER — ALLOPURINOL 300 MG/1
300 TABLET ORAL DAILY
COMMUNITY

## 2021-10-13 RX ORDER — BENZTROPINE MESYLATE 0.5 MG/1
0.5 TABLET ORAL 2 TIMES DAILY
Status: DISCONTINUED | OUTPATIENT
Start: 2021-10-13 | End: 2021-10-15 | Stop reason: HOSPADM

## 2021-10-13 RX ORDER — ONDANSETRON 2 MG/ML
4 INJECTION INTRAMUSCULAR; INTRAVENOUS EVERY 6 HOURS PRN
Status: DISCONTINUED | OUTPATIENT
Start: 2021-10-13 | End: 2021-10-15 | Stop reason: HOSPADM

## 2021-10-13 RX ORDER — OXYCODONE HYDROCHLORIDE 5 MG/1
5 TABLET ORAL EVERY 4 HOURS PRN
Status: ON HOLD | COMMUNITY
End: 2021-11-19

## 2021-10-13 RX ORDER — LORAZEPAM 2 MG/ML
2 INJECTION INTRAMUSCULAR EVERY 4 HOURS PRN
Status: DISCONTINUED | OUTPATIENT
Start: 2021-10-13 | End: 2021-10-15 | Stop reason: HOSPADM

## 2021-10-13 RX ORDER — ESCITALOPRAM OXALATE 20 MG/1
20 TABLET ORAL DAILY
COMMUNITY

## 2021-10-13 RX ORDER — PROPRANOLOL HYDROCHLORIDE 10 MG/1
10 TABLET ORAL 2 TIMES DAILY
COMMUNITY

## 2021-10-13 RX ORDER — ONDANSETRON 4 MG/1
4 TABLET, ORALLY DISINTEGRATING ORAL EVERY 8 HOURS PRN
Status: DISCONTINUED | OUTPATIENT
Start: 2021-10-13 | End: 2021-10-15 | Stop reason: HOSPADM

## 2021-10-13 RX ORDER — ESCITALOPRAM OXALATE 10 MG/1
20 TABLET ORAL DAILY
Status: DISCONTINUED | OUTPATIENT
Start: 2021-10-13 | End: 2021-10-15 | Stop reason: HOSPADM

## 2021-10-13 RX ORDER — MORPHINE SULFATE 4 MG/ML
1 INJECTION, SOLUTION INTRAMUSCULAR; INTRAVENOUS ONCE
Status: COMPLETED | OUTPATIENT
Start: 2021-10-13 | End: 2021-10-13

## 2021-10-13 RX ADMIN — LEVETIRACETAM 2000 MG: 100 INJECTION, SOLUTION INTRAVENOUS at 01:29

## 2021-10-13 RX ADMIN — ENOXAPARIN SODIUM 40 MG: 40 INJECTION SUBCUTANEOUS at 08:40

## 2021-10-13 RX ADMIN — SODIUM CHLORIDE, PRESERVATIVE FREE 10 ML: 5 INJECTION INTRAVENOUS at 20:04

## 2021-10-13 RX ADMIN — NICARDIPINE HYDROCHLORIDE 2 MG/HR: 0.1 INJECTION INTRAVENOUS at 08:36

## 2021-10-13 RX ADMIN — TRAZODONE HYDROCHLORIDE 100 MG: 100 TABLET ORAL at 20:13

## 2021-10-13 RX ADMIN — LISINOPRIL 20 MG: 10 TABLET ORAL at 08:40

## 2021-10-13 RX ADMIN — SODIUM CHLORIDE: 9 INJECTION, SOLUTION INTRAVENOUS at 02:17

## 2021-10-13 RX ADMIN — MORPHINE SULFATE 1 MG: 4 INJECTION INTRAVENOUS at 05:12

## 2021-10-13 RX ADMIN — PROPRANOLOL HYDROCHLORIDE 10 MG: 10 TABLET ORAL at 20:58

## 2021-10-13 RX ADMIN — HYDROCODONE BITARTRATE AND ACETAMINOPHEN 1 TABLET: 5; 325 TABLET ORAL at 20:14

## 2021-10-13 RX ADMIN — BENZTROPINE MESYLATE 0.5 MG: 0.5 TABLET ORAL at 20:14

## 2021-10-13 ASSESSMENT — PAIN DESCRIPTION - FREQUENCY: FREQUENCY: CONTINUOUS

## 2021-10-13 ASSESSMENT — PAIN SCALES - GENERAL
PAINLEVEL_OUTOF10: 10

## 2021-10-13 ASSESSMENT — PAIN DESCRIPTION - PAIN TYPE: TYPE: SURGICAL PAIN

## 2021-10-13 ASSESSMENT — PAIN DESCRIPTION - DESCRIPTORS: DESCRIPTORS: ACHING;CONSTANT;DISCOMFORT

## 2021-10-13 ASSESSMENT — PAIN DESCRIPTION - LOCATION: LOCATION: TOE (COMMENT WHICH ONE)

## 2021-10-13 NOTE — ED NOTES
Pt arrives to ED by EMS with CO altered mental status LKW 1hr PTA    NAD, resp even and non labored. speech clear and appropriate. A&Ox4 on arrival to ED. pt nonambulatory. side rails up x 2 call light within reach.        Keven Thomas RN  10/12/21 [id number]

## 2021-10-13 NOTE — H&P
[facility]  Office: [address], DO, Coancamaricel So, DO, Mohini Walsh, DO, Paxton Gonzalez Blood, DO, Tiffany Coronel MD, Priscilla Cristina MD, Jennifer Awan MD, Boy Pratt MD, Champ Wise MD, Maggie Saeed MD, Kaia Dougherty MD, Lorena Rosario MD, Kevan Brooke DO, Jayro Barragan DO, Ramya Avendaño MD,  Ismael Chino DO, Fabiola Nathan MD, Symone Quiles MD, Nan Guzamn MD, Madalyn Mora MD, Clement Hagen MD, Nicole Quintero MD, Ashley Bocanegra, [facility], [facility], CNP, Raza Ledbetter, CNP, Masha Godoy, CNS, Brandon Moreira, CNP, Maribell Godoy, CNP, Wilma Trevino, CNP, Chris Alejandre, [facility], Melany Palomares, [facility], Ramya Suarez, CNP, Rowan Oppenheim, PA-C, Luis Mock, [facility], Maxime Cobb, CNP, Mason Morel, CNP, Tran Beltran, CNP, Ana Sánchez, CNP, Michelle Milligan, CNP, Stewart Shah, CNP, George Brenner, [address]    HISTORY AND PHYSICAL EXAMINATION            Date:   10/13/2021  Patient name:  Nayeli Jones  Date of admission:  10/12/2021  7:39 PM  MRN:   [id number]  Account:  [de-identified]  YOB: 1965  PCP:    Lake Gusman MD  Room:   30/30  Code Status:    Full Code    Chief Complaint:     Chief Complaint   Patient presents with    Altered Mental Status     LKW 1 hour pta     History Obtained From:     patient, spouse, electronic medical record    History of Present Illness:     Nayeli Jones is a 55 y.o.  female who initially presented to [facility] on 10/12/2021 for evaluation of syncopal episode with questionable seizure-like activity. Patient's  had left the house to go to a convenience store. He believes that he was gone for less than 10 minutes. Prior to leaving, patient was awake and alert. After his return, the patient was unresponsive and with blood dripping from her mouth. There was questionable shaking as well.   Patient does not recall the event.  Her comorbidities include a history of degenerative disc disease of the lumbar spine status post pain pump implantation, fibromyalgia, depression, recent bunionectomy, and essential hypertension. EMS was called and patient was brought to the emergency department. Of note, the patient has been without her blood pressure medications for several days. Blood pressure at home was noted to be over 162 systolic. This was confirmed in the emergency department. CT of the head was performed which revealed an ill-defined area of a vague hypodensity in the left occipital lobe. Patient was a started on Cardene infusion. Past Medical History:     Past Medical History:   Diagnosis Date    Asthma     Borderline diabetes     Depression     2010    Fibromyalgia     Herpes simplex without mention of complication     Hypertensive urgency 10/12/2021    Sleep apnea 12/13/2011        Past Surgical History:     Past Surgical History:   Procedure Laterality Date    ABDOMINAL ADHESION SURGERY  12/29/11    BACK SURGERY  Cage and Screws    x's 3    BACK SURGERY  2/24/12    CHOLECYSTECTOMY  1990    DILATION [facility]TT[facility]11    [facility]    ENTEROCELE REPAIR  12/29/11    FOOT TENDON SURGERY      x's 2, Right Foot    HYSTERECTOMY  12/29/11    HYSTEROSCOPY  11/14/11    [facility]    PAULETTE AND BSO  12/29/11    [facility]    TONSILLECTOMY      as child    TUBAL LIGATION  1992        Medications Prior to Admission:     Prior to Admission medications    Medication Sig Start Date End Date Taking?  Authorizing Provider   acetaminophen (APAP EXTRA STRENGTH) 500 MG tablet Take 2 tablets by mouth every 6 hours as needed for Pain 6/26/20   Anneliese Aye, DO   ibuprofen (ADVIL;MOTRIN) 600 MG tablet Take 1 tablet by mouth every 6 hours as needed for Pain 6/26/20   Anneliese Aye, DO   lidocaine (LIDODERM) 5 % Place 1 patch onto the skin daily 12 hours on, 12 hours off. 6/26/20   Anneliese Aye, DO   buPROPion Crohn's Disease Neg Hx     Cystic Fibrosis Neg Hx     Dementia Neg Hx     JONATHAN Usage Neg Hx     Diabetes Neg Hx     Dislocations Neg Hx     Down Syndrome Neg Hx     Drug Abuse Neg Hx     Early Death Neg Hx     Eclampsia Neg Hx     Eczema Neg Hx     Elevated Lipids Neg Hx     Emphysema Neg Hx     Endometrial Cancer Neg Hx     Esophageal Cancer Neg Hx     Fainting Neg Hx     Glaucoma Neg Hx      Problems Neg Hx     Hearing Loss Neg Hx     Heart Attack Neg Hx     Heart Defect Neg Hx     Heart Disease Neg Hx     Heart Failure Neg Hx     Heart Surgery Neg Hx     Hemochromatosis Neg Hx     High Blood Pressure Neg Hx     High Cholesterol Neg Hx     Hypertension Neg Hx     Hypotension Neg Hx     Immunodeficiency Neg Hx     Infertility Neg Hx     Inflam Bowel Dis Neg Hx     Irritable Bowel Syndrome Neg Hx     Kidney Cancer Neg Hx     Kidney Disease Neg Hx     Learning Disabilities Neg Hx     Li-Fraumeni Syndrome Neg Hx     Liver Cancer Neg Hx     Liver Disease Neg Hx     Lung Cancer Neg Hx     Lupus Neg Hx     Macular Degen Neg Hx     Malig Hyperten Neg Hx     Malig Hypertherm Neg Hx     Marfan Syndrome Neg Hx     Memory Loss Neg Hx     Mental Illness Neg Hx     Mental Retardation Neg Hx     Migraines Neg Hx     Miscarriages / Stillbirths Neg Hx     Mult Sclerosis Neg Hx     Neurofibromatosis Neg Hx     Neuropathy Neg Hx     Obesity Neg Hx     OCD Neg Hx     Osteoarthritis Neg Hx     Osteoporosis Neg Hx     Other Neg Hx     Ovarian Cancer Neg Hx     Pacemaker Neg Hx     Paranoid Behavior Neg Hx     Parkinsonism Neg Hx     Physical Abuse Neg Hx     PKU Neg Hx     Premature Birth Neg Hx      Labor Neg Hx     Prostate Cancer Neg Hx     Pseudochol.  Deficiency Neg Hx     Psoriasis Neg Hx     Rashes/Skin Problems Neg Hx     Rectal Cancer Neg Hx     Retinal Detachment Neg Hx     Rheum Arthritis Neg Hx     Rheumatologic Disease Neg Hx     Schizophrenia Neg Hx     Scoliosis Neg Hx     Seizures Neg Hx     Severe Sprains Neg Hx     Sexual Abuse Neg Hx     Sickle Cell Anemia Neg Hx     Sickle Cell Trait Neg Hx     SIDS Neg Hx     Spont Abortions Neg Hx     Stillborn Neg Hx     Stomach Cancer Neg Hx     Strabismus Neg Hx     Stroke Neg Hx     Substance Abuse Neg Hx     Sudden Death Neg Hx     Thyroid Cancer Neg Hx     Thyroid Disease Neg Hx     Tuberculosis Neg Hx     Ulcerative Colitis Neg Hx     Urolithiasis Neg Hx     Urticaria Neg Hx     Uterine Cancer Neg Hx     Vaginal Cancer Neg Hx     Vision Loss Neg Hx     Farooq's Disease Neg Hx        Review of Systems:     Positive and Negative as described in HPI.     CONSTITUTIONAL:  negative for fevers, chills, sweats, fatigue, weight loss  HEENT:  negative for vision, hearing changes, runny nose, throat pain  RESPIRATORY:  negative for shortness of breath, cough, congestion, wheezing  CARDIOVASCULAR:  negative for chest pain, palpitations  GASTROINTESTINAL:  negative for nausea, vomiting, diarrhea, constipation, change in bowel habits, abdominal pain   GENITOURINARY:  negative for difficulty of urination, burning with urination, frequency   INTEGUMENT:  negative for rash, skin lesions, easy bruising   HEMATOLOGIC/LYMPHATIC:  negative for swelling/edema   ALLERGIC/IMMUNOLOGIC:  negative for urticaria , itching  ENDOCRINE:  negative increase in drinking, increase in urination, hot or cold intolerance  MUSCULOSKELETAL: Reports right foot pain; negative joint pains, muscle aches, swelling of joints  NEUROLOGICAL: Reports mild headache; negative for dizziness, lightheadedness, numbness, pain, tingling extremities  BEHAVIOR/PSYCH:  negative for depression, anxiety    Physical Exam:   BP (!) 173/89   Pulse 85   Temp 98.1 °F (36.7 °C) (Oral)   Resp 19   LMP 2011   SpO2 93%   Temp (24hrs), Av.1 °F (36.7 °C), Min:98.1 °F (36.7 °C), Max:98.1 °F (36.7 °C)    No results for input(s): POCGLU in the last 72 hours. Intake/Output Summary (Last 24 hours) at 10/13/2021 1504  Last data filed at 10/13/2021 1249  Gross per 24 hour   Intake 790 ml   Output 1050 ml   Net -260 ml       General Appearance: alert, well appearing, and in no acute distress, obese  female sitting up in bed. Mental status: oriented to person, place, and time  Head: normocephalic, atraumatic  Eye: no icterus, redness, pupils equal and reactive, extraocular eye movements intact, conjunctiva clear  Ear: normal external ear, no discharge, hearing intact  Nose: no drainage noted  Mouth: mucous membranes moist  Neck: supple, no carotid bruits, thyroid not palpable  Lungs: Bilateral equal air entry, clear to ausculation, no wheezing, rales or rhonchi, normal effort  Cardiovascular: normal rate, regular rhythm, no murmur, gallop, rub  Abdomen: Soft, nontender, nondistended, normal bowel sounds, no hepatomegaly or splenomegaly  Neurologic: There are no new focal motor or sensory deficits, normal muscle tone and bulk, no abnormal sensation, normal speech, cranial nerves II through XII grossly intact.  strength equal bilaterally.   Skin: No gross lesions, rashes, bruising or bleeding on exposed skin area  Extremities: peripheral pulses palpable, no pedal edema or calf pain with palpation, right foot bandaged  Psych: normal affect    Investigations:      Laboratory Testing:  Recent Results (from the past 24 hour(s))   EKG 12 Lead    Collection Time: 10/12/21  7:45 PM   Result Value Ref Range    Ventricular Rate 79 BPM    Atrial Rate 79 BPM    P-R Interval 154 ms    QRS Duration 80 ms    Q-T Interval 398 ms    QTc Calculation (Bazett) 456 ms    P Axis 50 degrees    R Axis -7 degrees    T Axis 40 degrees   CBC WITH AUTO DIFFERENTIAL    Collection Time: 10/12/21  8:00 PM   Result Value Ref Range    WBC 11.6 (H) 3.5 - 11.3 k/uL    RBC 4.85 3.95 - 5.11 m/uL    Hemoglobin 13.7 11.9 - 15.1 g/dL    Hematocrit 42.4 36.3 - 47.1 %    MCV 87.4 82.6 - 102.9 fL    MCH 28.2 25.2 - 33.5 pg    MCHC 32.3 28.4 - 34.8 g/dL    RDW 13.5 11.8 - 14.4 %    Platelets 990 266 - 341 k/uL    MPV 8.6 8.1 - 13.5 fL    NRBC Automated 0.0 0.0 per 100 WBC    Differential Type NOT REPORTED     Seg Neutrophils 90 (H) 36 - 65 %    Lymphocytes 6 (L) 24 - 43 %    Monocytes 3 3 - 12 %    Eosinophils % 0 (L) 1 - 4 %    Basophils 0 0 - 2 %    Immature Granulocytes 1 (H) 0 %    Segs Absolute 10.29 (H) 1.50 - 8.10 k/uL    Absolute Lymph # 0.74 (L) 1.10 - 3.70 k/uL    Absolute Mono # 0.38 0.10 - 1.20 k/uL    Absolute Eos # 0.04 0.00 - 0.44 k/uL    Basophils Absolute 0.03 0.00 - 0.20 k/uL    Absolute Immature Granulocyte 0.11 0.00 - 0.30 k/uL    WBC Morphology NOT REPORTED     RBC Morphology NOT REPORTED     Platelet Estimate NOT REPORTED    COMPREHENSIVE METABOLIC PANEL    Collection Time: 10/12/21  8:00 PM   Result Value Ref Range    Glucose 197 (H) 70 - 99 mg/dL    BUN 14 6 - 20 mg/dL    CREATININE 0.84 0.50 - 0.90 mg/dL    Bun/Cre Ratio NOT REPORTED 9 - 20    Calcium 9.9 8.6 - 10.4 mg/dL    Sodium 137 135 - 144 mmol/L    Potassium 3.9 3.7 - 5.3 mmol/L    Chloride 97 (L) 98 - 107 mmol/L    CO2 21 20 - 31 mmol/L    Anion Gap 19 (H) 9 - 17 mmol/L    Alkaline Phosphatase 83 35 - 104 U/L    ALT 39 (H) 5 - 33 U/L    AST 27 <32 U/L    Total Bilirubin 0.61 0.3 - 1.2 mg/dL    Total Protein 8.1 6.4 - 8.3 g/dL    Albumin 5.2 3.5 - 5.2 g/dL    Albumin/Globulin Ratio 1.8 1.0 - 2.5    GFR Non-African American >60 >60 mL/min    GFR African American >60 >60 mL/min    GFR Comment          GFR Staging NOT REPORTED    PROLACTIN    Collection Time: 10/12/21  8:00 PM   Result Value Ref Range    Prolactin 8.76 4.79 - 23.30 ug/L   Urine Drug Screen    Collection Time: 10/12/21  9:00 PM   Result Value Ref Range    Amphetamine Screen, Ur NEGATIVE NEGATIVE    Barbiturate Screen, Ur NEGATIVE NEGATIVE    Benzodiazepine Screen, Urine NEGATIVE NEGATIVE    Cocaine Metabolite, Urine NEGATIVE NEGATIVE    Methadone Screen, Urine NEGATIVE NEGATIVE    Opiates, Urine NEGATIVE NEGATIVE    Phencyclidine, Urine NEGATIVE NEGATIVE    Propoxyphene, Urine NOT REPORTED NEGATIVE    Cannabinoid Scrn, Ur NEGATIVE NEGATIVE    Oxycodone Screen, Ur POSITIVE (A) NEGATIVE    Methamphetamine, Urine NOT REPORTED NEGATIVE    Tricyclic Antidepressants, Urine NOT REPORTED NEGATIVE    MDMA, Urine NOT REPORTED NEGATIVE    Buprenorphine Urine NOT REPORTED NEGATIVE    Test Information       Assay provides medical screening only. The absence of expected drug(s) and/or metabolite(s) may indicate diluted or adulterated urine, limitations of testing or timing of collection. COVID-19, Rapid    Collection Time: 10/13/21  2:00 AM    Specimen: Nasopharyngeal Swab   Result Value Ref Range    Specimen Description . NASOPHARYNGEAL SWAB     SARS-CoV-2, Rapid Not Detected Not Detected   Comprehensive Metabolic Panel w/ Reflex to MG    Collection Time: 10/13/21  5:03 AM   Result Value Ref Range    Glucose 133 (H) 70 - 99 mg/dL    BUN 14 6 - 20 mg/dL    CREATININE 0.80 0.50 - 0.90 mg/dL    Bun/Cre Ratio NOT REPORTED 9 - 20    Calcium 8.9 8.6 - 10.4 mg/dL    Sodium 137 135 - 144 mmol/L    Potassium 3.5 (L) 3.7 - 5.3 mmol/L    Chloride 98 98 - 107 mmol/L    CO2 25 20 - 31 mmol/L    Anion Gap 14 9 - 17 mmol/L    Alkaline Phosphatase 68 35 - 104 U/L    ALT 31 5 - 33 U/L    AST 19 <32 U/L    Total Bilirubin 0.49 0.3 - 1.2 mg/dL    Total Protein 6.8 6.4 - 8.3 g/dL    Albumin 4.6 3.5 - 5.2 g/dL    Albumin/Globulin Ratio 2.1 1.0 - 2.5    GFR Non-African American >60 >60 mL/min    GFR African American >60 >60 mL/min    GFR Comment          GFR Staging NOT REPORTED    CBC    Collection Time: 10/13/21  5:03 AM   Result Value Ref Range    WBC 9.9 3.5 - 11.3 k/uL    RBC 4.31 3.95 - 5.11 m/uL    Hemoglobin 12.0 11.9 - 15.1 g/dL    Hematocrit 37.9 36.3 - 47.1 %    MCV 87.9 82.6 - 102.9 fL    MCH 27.8 25.2 - 33.5 pg    MCHC 31.7 28.4 - 34.8 g/dL    RDW 13.6 11.8 - 14.4 % Platelets 584 834 - 641 k/uL    MPV 8.3 8.1 - 13.5 fL    NRBC Automated 0.0 0.0 per 100 WBC   Protime-INR    Collection Time: 10/13/21  5:03 AM   Result Value Ref Range    Protime 11.2 9.1 - 12.3 sec    INR 1.1    Magnesium    Collection Time: 10/13/21  5:03 AM   Result Value Ref Range    Magnesium 2.2 1.6 - 2.6 mg/dL       Imaging/Diagnostics:  XR FOOT RIGHT (MIN 3 VIEWS)    Result Date: 10/13/2021  Status post suspected bunionectomy with displaced distal osteotomy fragment; the 1st metatarsal head fragment is laterally dislocated relative to the 1st proximal phalangeal base. CT HEAD WO CONTRAST    Result Date: 10/12/2021  Ill-defined area of vague hypodensity noted in the left occipital lobe. Given the history of new onset seizure, evaluation with contrast-enhanced CT or MRI would be helpful. XR CHEST PORTABLE    Result Date: 10/12/2021  No radiographic evidence of acute cardiopulmonary disease. Assessment :      Hospital Problems         Last Modified POA    * (Principal) Hypertensive urgency 10/13/2021 Yes    Seizure-like activity ([facility] [address] 10/13/2021 Yes    Fibromyalgia 10/13/2021 Yes    History of lumbosacral spine surgery 10/13/2021 Yes    Overview Signed 12/13/2011 10:14 AM by Mauro Del Castillo DO     Cage and Screws         Sleep apnea 10/13/2021 Yes    Overview Signed 12/13/2011 10:15 AM by Mauro Del Castillo DO     To bring with any hospitalization         Essential hypertension 10/13/2021 Yes    Hypokalemia 10/13/2021 Yes    Depression 10/13/2021 Yes          Plan:     Patient status inpatient in the Progressive Unit/Step down    1. Admit to [facility]  2. Hypertensive emergency - systolic blood pressures in the 200s upon arrival.  Patient without blood pressure medications for several days. Continue Cardene drip. Reimplement home medications including lisinopril-HCTZ and propranolol. Hep-Lock IV fluids at present. Do not drop blood pressure too quickly.   Wean off Cardene to maintain

## 2021-10-13 NOTE — ED NOTES
Writer spoke with pt about her ID card for her pain pump. Pt stated that she does not have the card with her. Per MRI, scan can not be completed without information of the pump. Writer spoke with [employer] regarding pt's pain pump. Writer given instructions about how to program parameters per their website, was also informed that the pump had to be emptied.      Miky Oviedo, RN  10/13/21 [address] [place] [address], RN  10/13/21 [address] [place] [address], RN  10/13/21 4418

## 2021-10-13 NOTE — CARE COORDINATION
Case Management Initial Discharge Plan  Awais Duckworth,             Met with:patient to discuss discharge plans. Information verified: address, contacts, phone number, , insurance Yes  Insurance Provider: [address] [place] [address]    Emergency Contact/Next of Kin name & number:   Oniel Mcgrath      Spouse    [phone]       Who are involved in patient's support system?     PCP: Christopher Nayak MD  Date of last visit: 2 weeks      Discharge Planning    Living Arrangements:        Home has 2 stories  4 stairs to climb to get into front door, 14stairs to climb to reach second floor  Location of bedroom/bathroom in home main    Patient able to perform ADL's:Independent    Current Services (outpatient & in home) none  DME equipment: knee rollator, grab bars  DME provider:     Is patient receiving oral anticoagulation therapy? No    If indicated:   Physician managing anticoagulation treatment: n/a  Where does patient obtain lab work for ATC treatment? n/a      Potential Assistance Needed:       Patient agreeable to home care: No  [place] of choice provided:  n/a    Prior SNF/Rehab Placement and Facility: none  Agreeable to SNF/Rehab: No  [place] of choice provided: n/a     Evaluation: no    Expected Discharge date:       Patient expects to be discharged to: If home: is the family and/or caregiver wiling & able to provide support at home? yes  Who will be providing this support? *    Follow Up Appointment: Best Day/ Time:      Transportation provider: [facility]  Transportation arrangements needed for discharge: No    Readmission Risk              Risk of Unplanned Readmission:  6             Does patient have a readmission risk score greater than 14?: No  If yes, follow-up appointment must be made within 7 days of discharge.      Goals of Care: bp control, answers      Educated pt on transitional options, provided freedom of choice and are agreeable with plan      Discharge Plan: Home independently,pcp established, has transportation          Electronically signed by Diana Brooks RN on 10/13/21 at 12:27 PM EDT

## 2021-10-13 NOTE — ED NOTES
Pt states that she now has her ID card for her pain pump. MRI checklist completed and faxed with ID card for pain pump to MRI.      Chastity Arredondo RN  10/13/21 [id number]

## 2021-10-13 NOTE — ED NOTES
Writer at bedside to complete MRI checklist, pt stating \"I can't have it done\". When asked why she could not have the MRI done pt stated that she has a pain pump in her back. Pt stated \"You'll have to drain out all the Morphine in order to do it\". When writer asked pt what she had the pain pump for pt stated \"back pain\". Pt requesting additional pain meds for her back, throat, and foot.      Matthew Vázquez RN  10/13/21 0104

## 2021-10-13 NOTE — ED NOTES
Pt requesting RN, writer to bedside. Pt stating that she spoke with her physicians office that implanted the pain pump and stated \"They said if it is an emergency any nurse could drain the medication out and program the pump\". Pt informed that staff in the ED is not trained to do this and that we do not handle pain pump medications. Pt became upset and stated \"Well then you guys aren't gonna do anything for me\". Pt informed that writer spoke with the company that the pump is through and that they were finding a local rep to call and speak with writer regarding this. Pt stated that she did not understand why we couldn't do it and stated she was told last night that she would have bed this AM.  Pt again informed pt that we do not handle medications or implanted pumps in the ED and that when beds become available that she would get a bed. Pt requesting to speak with charge RN. Ashley Tavarez RN Coordinator notified and to speak with pt.      Rayne Escalante RN  10/13/21 2013

## 2021-10-13 NOTE — ED NOTES
Attempted to call report to 4A RN. Phone rang to 4B and writer was instructed to call back.      Alan Honeycutt RN  10/13/21 6400

## 2021-10-13 NOTE — PROCEDURES
Called ashlee @1969 about med pump prior to MRI and advised we need a rep for STAT MRI order.  Waiting for call back

## 2021-10-13 NOTE — CONSULTS
[facility] Neurology   IN-PATIENT SERVICE    NEUROLOGY CONSULT  NOTE            Date:   10/12/2021  Patient name:  Shaheen Thompson  Date of admission:  10/12/2021  YOB: 1965      Chief Complaint:     Chief Complaint   Patient presents with    Altered Mental Status     LKW 1 hour pta     Reason for Consult:      Seizures, altered mental status, hypertensive encephalopathy    History of Present Illness:     55years old female patient with past medical history of hypertension on lisinopril, borderline diabetes, fibromyalgia, obesity, sleep apnea, depression, asthma recent surgery to her right big toe [bunion surgery] on 10/9/2021    Presented with possible LOC+ amnesia+ headache+ tongue biting+ dyspnea     Details of the story is that the patient was last well-known was 6:30 PM today 10/12/2021. As per patient's , he went to the local store for about 6 minutes left the patient at home sitting on the couch and he came back, he found the patient sleeping. He tried to wake her up however he noticed that she had altered mental status with evidence of tongue biting and some dried blood in the oral cavity. He called EMS and it took him 10 minutes to arrive and during that time the patient was acting confused and was not sure where she was while at home.  reported that there was some bilateral lower extremity shaking as she was waking up. Patient endorsed having amnesia and headache after the event. Stepping back a little bit from today's event, yesterday the patient had headaches and also elevated blood pressure reading; she reported that she has been having this constant headache that is located bifrontally, 7 out of 10 in intensity, throbbing, no radiation, no alleviating or exacerbating factors    Patient was hypertensive on presentation SBP around 200s  Neuro examination was intact and nonfocal, there was evidence of tongue bite on the right side.   Patient was awake alert and oriented not in distress    CT head showed ill-defined area of vague hypodensity in the left occipital lobe. Patient was started on Cardene infusion. Possible impression is PRES syndrome, hypertensive encephalopathy. MRI of the brain was discussed with the patient, however she explained that she has pain pump for her back pain and she will not be able to get the MRI. She had this pain pump for many years now. Past Medical History:     Past Medical History:   Diagnosis Date    Asthma     Borderline diabetes     Depression     2010    Fibromyalgia     Herpes simplex without mention of complication     Sleep apnea 12/13/2011        Past Surgical History:     Past Surgical History:   Procedure Laterality Date    ABDOMINAL ADHESION SURGERY  12/29/11    BACK SURGERY  Cage and Screws    x's 3    BACK SURGERY  2/24/12    CHOLECYSTECTOMY  1990    DIL[facility]TT[facility]11    [facility]    ENTEROCELE REPAIR  12/29/11    FOOT TENDON SURGERY      x's 2, Right Foot    HYSTERECTOMY  12/29/11    HYSTEROSCOPY  11/14/11    [facility]    PAULETTE AND BSO  12/29/11    [facility]    TONSILLECTOMY      as child    TUBAL LIGATION  1992        Medications Prior to Admission:     Prior to Admission medications    Medication Sig Start Date End Date Taking?  Authorizing Provider   acetaminophen (APAP EXTRA STRENGTH) 500 MG tablet Take 2 tablets by mouth every 6 hours as needed for Pain 6/26/20   Leila Heath, DO   ibuprofen (ADVIL;MOTRIN) 600 MG tablet Take 1 tablet by mouth every 6 hours as needed for Pain 6/26/20   Leila Heath, DO   lidocaine (LIDODERM) 5 % Place 1 patch onto the skin daily 12 hours on, 12 hours off. 6/26/20   Leila Heath, DO   buPROPion (WELLBUTRIN) 75 MG tablet Take 75 mg by mouth 2 times daily    Historical Provider, MD   busPIRone (BUSPAR) 10 MG tablet Take 10 mg by mouth 3 times daily    Historical Provider, MD   fluticasone (FLONASE) 50 MCG/ACT nasal spray 1 spray by Nasal route daily Historical Provider, MD   fluoxetine (PROZAC) 20 MG capsule Take 20 mg by mouth daily. 11/23/11   Historical Provider, MD   lisinopril (PRINIVIL;ZESTRIL) 20 MG tablet Take 20 mg by mouth daily. Historical Provider, MD   Albuterol Sulfate (PROAIR HFA IN) Inhale 2 puffs into the lungs as needed. Historical Provider, MD        Allergies:     Bactrim, Demerol, Lyrica [pregabalin], and Pcn [penicillins]    Social History:     Tobacco:    reports that she has never smoked. She has never used smokeless tobacco.  Alcohol:      reports current alcohol use. Drug Use:  reports no history of drug use.     Family History:     Family History   Problem Relation Age of Onset    Depression Mother     ADHD Neg Hx     Alcohol Abuse Neg Hx     Allergic Rhinitis Neg Hx     Allergies Neg Hx     Allergy (Severe) Neg Hx     Alzheimer's Disease Neg Hx     Amblyopia Neg Hx     Anemia Neg Hx     Anesth Problems Neg Hx     Angioedema Neg Hx     Anxiety Disorder Neg Hx     Arrhythmia Neg Hx     Arthritis Neg Hx     Asthma Neg Hx     Ataxia Neg Hx     Atopy Neg Hx     Bipolar Disorder Neg Hx     Birth Defects Neg Hx     Bleeding Prob Neg Hx     Blindness Neg Hx     Brain Cancer Neg Hx     BRCA 1/2 Neg Hx     Breast Cancer Neg Hx     Broken Bones Neg Hx     Cancer Neg Hx     Cataracts Neg Hx     Celiac Disease Neg Hx     Cervical Cancer Neg Hx     Chdhd Hrt Surg Neg Hx     Chdhd Resp Dis Neg Hx     Chorea Neg Hx     Chronic Infections Neg Hx     Cirrhosis Neg Hx     Clotting Disorder Neg Hx     Collagen Disease Neg Hx     Colon Cancer Neg Hx     Colon Polyps Neg Hx     COPD Neg Hx     Coronary Art Dis Neg Hx     Cowden Syndrome Neg Hx     Crohn's Disease Neg Hx     Cystic Fibrosis Neg Hx     Dementia Neg Hx     JONATHAN Usage Neg Hx     Diabetes Neg Hx     Dislocations Neg Hx     Down Syndrome Neg Hx     Drug Abuse Neg Hx     Early Death Neg Hx     Eclampsia Neg Hx     Eczema Neg Hx     Elevated Lipids Neg Hx     Emphysema Neg Hx     Endometrial Cancer Neg Hx     Esophageal Cancer Neg Hx     Fainting Neg Hx     Glaucoma Neg Hx      Problems Neg Hx     Hearing Loss Neg Hx     Heart Attack Neg Hx     Heart Defect Neg Hx     Heart Disease Neg Hx     Heart Failure Neg Hx     Heart Surgery Neg Hx     Hemochromatosis Neg Hx     High Blood Pressure Neg Hx     High Cholesterol Neg Hx     Hypertension Neg Hx     Hypotension Neg Hx     Immunodeficiency Neg Hx     Infertility Neg Hx     Inflam Bowel Dis Neg Hx     Irritable Bowel Syndrome Neg Hx     Kidney Cancer Neg Hx     Kidney Disease Neg Hx     Learning Disabilities Neg Hx     Li-Fraumeni Syndrome Neg Hx     Liver Cancer Neg Hx     Liver Disease Neg Hx     Lung Cancer Neg Hx     Lupus Neg Hx     Macular Degen Neg Hx     Malig Hyperten Neg Hx     Malig Hypertherm Neg Hx     Marfan Syndrome Neg Hx     Memory Loss Neg Hx     Mental Illness Neg Hx     Mental Retardation Neg Hx     Migraines Neg Hx     Miscarriages / Stillbirths Neg Hx     Mult Sclerosis Neg Hx     Neurofibromatosis Neg Hx     Neuropathy Neg Hx     Obesity Neg Hx     OCD Neg Hx     Osteoarthritis Neg Hx     Osteoporosis Neg Hx     Other Neg Hx     Ovarian Cancer Neg Hx     Pacemaker Neg Hx     Paranoid Behavior Neg Hx     Parkinsonism Neg Hx     Physical Abuse Neg Hx     PKU Neg Hx     Premature Birth Neg Hx      Labor Neg Hx     Prostate Cancer Neg Hx     Pseudochol.  Deficiency Neg Hx     Psoriasis Neg Hx     Rashes/Skin Problems Neg Hx     Rectal Cancer Neg Hx     Retinal Detachment Neg Hx     Rheum Arthritis Neg Hx     Rheumatologic Disease Neg Hx     Schizophrenia Neg Hx     Scoliosis Neg Hx     Seizures Neg Hx     Severe Sprains Neg Hx     Sexual Abuse Neg Hx     Sickle Cell Anemia Neg Hx     Sickle Cell Trait Neg Hx     SIDS Neg Hx     Spont Abortions Neg Hx     Stillborn Neg Hx     Stomach Cancer Neg Hx  Strabismus Neg Hx     Stroke Neg Hx     Substance Abuse Neg Hx     Sudden Death Neg Hx     Thyroid Cancer Neg Hx     Thyroid Disease Neg Hx     Tuberculosis Neg Hx     Ulcerative Colitis Neg Hx     Urolithiasis Neg Hx     Urticaria Neg Hx     Uterine Cancer Neg Hx     Vaginal Cancer Neg Hx     Vision Loss Neg Hx     Farooq's Disease Neg Hx        Review of Systems:       Constitutional Negative for fever and chills   HEENT Negative for ear discharge, ear pain, nosebleed   Eyes Negative for photophobia, pain and discharge   Respiratory Negative for hemoptysis and sputum   Cardiovascular Negative for orthopnea, claudication and PND   Gastrointestinal Negative for abdominal pain, diarrhea, blood in stool   Musculoskeletal Negative for joint pain, negative for myalgia   Skin Negative for rash or itching   hematology Negative for ecchymosis, anemia   Psychiatric Negative for suicidal ideation, anxiety, depression, hallucinations       Physical Exam:   BP (!) 191/113   Pulse 80   Temp 98.1 °F (36.7 °C) (Oral)   Resp 22   LMP 2011   SpO2 93%   Temp (24hrs), Av.1 °F (36.7 °C), Min:98.1 °F (36.7 °C), Max:98.1 °F (36.7 °C)    General examination:      General Appearance:  alert, well appearing, and in no acute distress  HEENT: Normocephalic, dried blood noticed on the side of the mouth, moist mucus membranes  Neck: supple, no carotid bruits, (-) nuchal rigidity  Lungs:  Respirations unlabored, chest wall no deformity, BS normal  Cardiovascular: normal rate, regular rhythm  Abdomen: Soft, nontender, nondistended, normal bowel sounds  Skin: No gross lesions, rashes, bruising or bleeding on exposed skin area  Extremities:  peripheral pulses palpable, no cyanosis, clubbing or edema  Psych: normal affect  Neurological examination:      Mental status   Alert and oriented x 3; following all commands;   speech is fluent, no dysarthria, aphasia.       Cranial nerves   II - visual fields intact to confrontation; pupils reactive  III, IV, VI - extraocular muscles intact; no TYSON; no nystagmus; no ptosis   V - normal facial sensation                                                               VII - normal facial symmetry                                                             VIII - intact hearing                                                                             IX, X - symmetrical palate elevation                                               XI - symmetrical shoulder shrug                                                       XII - midline tongue without atrophy or fasciculation     Motor function  Strength:   5/5 RUE, 5/5 RLE  5/5 LUE, 5/5  LLE  Normal bulk and tone. Sensory function Intact to touch, pin, vibration, proprioception throughout     Cerebellar Intact finger-nose-finger testing. Intact heel-shin testing. No dysdiadochokinesia present. No tremors                        Reflex function 2/4 symmetric throughout . Downgoing plantar response bilaterally. (-)Crockett's sign bilaterally      Gait                   was not examined       Diagnostics:      Laboratory Testing:  CBC:   Recent Labs     10/12/21  2000   WBC 11.6*   HGB 13.7        BMP:    Recent Labs     10/12/21  2000      K 3.9   CL 97*   CO2 21   BUN 14   CREATININE 0.84   GLUCOSE 197*         Lab Results   Component Value Date    CHOL 189 10/10/2016    LDLCHOLESTEROL 105 10/10/2016    HDL 54 10/10/2016    TRIG 151 (H) 10/10/2016    ALT 39 (H) 10/12/2021    AST 27 10/12/2021    INR 1.0 10/26/2011    LABA1C 5.5 10/10/2016    LABMICR 12 10/17/2016       No results found for: PHENYTOIN, PHENYTOIN, VALPROATE, CBMZ      Imaging/Diagnostics:  XR FOOT RIGHT (MIN 3 VIEWS)    Result Date: 10/12/2021  EXAMINATION: THREE X-RAY VIEWS OF THE RIGHT FOOT 10/12/2021 7:50 pm COMPARISON: None.  HISTORY: ORDERING SYSTEM PROVIDED HISTORY: wound eval TECHNOLOGIST PROVIDED HISTORY: wound eval FINDINGS: There is evidence of previous surgery at the distal aspect of the right 1st metatarsal.  There is a suspected osteotomy with fixation screw noted, suggesting bunionectomy. However, the distal fragment of the osteotomy/1st metatarsal head is laterally displaced and dislocated relative to the base of the 1st proximal phalanx. No other acute fracture or dislocation. Joint spaces are preserved. Status post suspected bunionectomy with displaced distal osteotomy fragment; the 1st metatarsal head fragment is laterally dislocated relative to the 1st proximal phalangeal base. CT HEAD WO CONTRAST    Result Date: 10/12/2021  EXAMINATION: CT OF THE HEAD WITHOUT CONTRAST  10/12/2021 8:41 pm TECHNIQUE: CT of the head was performed without the administration of intravenous contrast. Dose modulation, iterative reconstruction, and/or weight based adjustment of the mA/kV was utilized to reduce the radiation dose to as low as reasonably achievable. COMPARISON: CT head 06/26/2020 HISTORY: ORDERING SYSTEM PROVIDED HISTORY: new onset seizure TECHNOLOGIST PROVIDED HISTORY: new onset seizure Decision Support Exception - unselect if not a suspected or confirmed emergency medical condition->Emergency Medical Condition (MA) Is the patient pregnant?->No Reason for Exam: Altered Mental Status - New Onset Seizure. Recent Right foot surgery (1st Digit). Acuity: Unknown Type of Exam: Unknown FINDINGS: BRAIN/VENTRICLES: There is an ill-defined area of vague hypodensity seen in the left medial occipital lobe. This was not present on the prior study. There is no acute intracranial hemorrhage, significant mass effect or midline shift. No abnormal extra-axial fluid collection. There is no evidence of hydrocephalus. Atherosclerotic vascular calcifications of the vertebral arteries noted ORBITS: The visualized portion of the orbits demonstrate no acute abnormality. SINUSES: The visualized paranasal sinuses and mastoid air cells demonstrate no acute abnormality. SOFT TISSUES/SKULL:  No acute abnormality of the visualized skull or soft tissues. Ill-defined area of vague hypodensity noted in the left occipital lobe. Given the history of new onset seizure, evaluation with contrast-enhanced CT or MRI would be helpful. XR CHEST PORTABLE    Result Date: 10/12/2021  EXAMINATION: ONE XRAY VIEW OF THE CHEST 10/12/2021 7:50 pm COMPARISON: June 26, 2020 HISTORY: ORDERING SYSTEM PROVIDED HISTORY: Altered mental status TECHNOLOGIST PROVIDED HISTORY: ams Reason for Exam: port Upright FINDINGS: Cardiomediastinal silhouette within normal limits. Prominent interstitial markings similar to prior study. No focal consolidation. No definite effusion. No pneumothorax or subdiaphragmatic free air. No acute osseous abnormality identified. No radiographic evidence of acute cardiopulmonary disease. Impression:      55years old female patient with past medical history of hypertension on lisinopril, borderline diabetes, fibromyalgia, obesity, sleep apnea, depression, asthma recent surgery to her right big toe    Presented with possible LOC+ amnesia+ headache+ tongue biting+ dyspnea   Patient was hypertensive on presentation with SBP around 200s  Neuro examination was intact  Possible differential include PRES syndrome, hypertensive encephalopathy, reversible cerebral vasoconstriction syndrome.     Plan:      Brain MRI with and without contrast with seizure protocol, will check compatibility of the pump   EEG   LOC work-up   Seizure precautions   Falling down precautions   Ativan 2 mg IV as needed for seizures   Keppra 2 g IV loading   Reduce BP about 15% in the first 6 hours    Electronically signed by Jose Salcedo MD on 10/12/2021 at 10:12 PM    Electronically signed by   Jose Salcedo MD  10/12/2021  10:12 PM

## 2021-10-13 NOTE — ED NOTES
Writer called [facility] regarding when a rep would be out to empty the medication and program it for MRI. Writer informed that the rep has not replied to the email that was sent this AM and stated that they would call someone and have them call the ED.      Amish Harrison RN  10/13/21 [id number]

## 2021-10-13 NOTE — ED NOTES
Writer Perfect Served admitting service regarding pain medication orders. Waiting on response.      Magalis Nieves RN  10/13/21 [id number]

## 2021-10-14 ENCOUNTER — APPOINTMENT (OUTPATIENT)
Dept: CT IMAGING | Age: 56
DRG: 305 | End: 2021-10-14
Payer: MEDICARE

## 2021-10-14 LAB
ABSOLUTE EOS #: 0.19 K/UL (ref 0–0.44)
ABSOLUTE IMMATURE GRANULOCYTE: 0.04 K/UL (ref 0–0.3)
ABSOLUTE LYMPH #: 1.35 K/UL (ref 1.1–3.7)
ABSOLUTE MONO #: 0.72 K/UL (ref 0.1–1.2)
ANION GAP SERPL CALCULATED.3IONS-SCNC: 11 MMOL/L (ref 9–17)
BASOPHILS # BLD: 1 % (ref 0–2)
BASOPHILS ABSOLUTE: 0.03 K/UL (ref 0–0.2)
BUN BLDV-MCNC: 17 MG/DL (ref 6–20)
BUN/CREAT BLD: ABNORMAL (ref 9–20)
CALCIUM SERPL-MCNC: 9.2 MG/DL (ref 8.6–10.4)
CHLORIDE BLD-SCNC: 100 MMOL/L (ref 98–107)
CO2: 25 MMOL/L (ref 20–31)
CREAT SERPL-MCNC: 0.85 MG/DL (ref 0.5–0.9)
DIFFERENTIAL TYPE: ABNORMAL
EOSINOPHILS RELATIVE PERCENT: 3 % (ref 1–4)
GFR AFRICAN AMERICAN: >60 ML/MIN
GFR NON-AFRICAN AMERICAN: >60 ML/MIN
GFR SERPL CREATININE-BSD FRML MDRD: ABNORMAL ML/MIN/{1.73_M2}
GFR SERPL CREATININE-BSD FRML MDRD: ABNORMAL ML/MIN/{1.73_M2}
GLUCOSE BLD-MCNC: 117 MG/DL (ref 70–99)
HCT VFR BLD CALC: 39.2 % (ref 36.3–47.1)
HEMOGLOBIN: 11.9 G/DL (ref 11.9–15.1)
IMMATURE GRANULOCYTES: 1 %
LYMPHOCYTES # BLD: 21 % (ref 24–43)
MAGNESIUM: 2.2 MG/DL (ref 1.6–2.6)
MCH RBC QN AUTO: 27.9 PG (ref 25.2–33.5)
MCHC RBC AUTO-ENTMCNC: 30.4 G/DL (ref 28.4–34.8)
MCV RBC AUTO: 91.8 FL (ref 82.6–102.9)
MONOCYTES # BLD: 11 % (ref 3–12)
NRBC AUTOMATED: 0 PER 100 WBC
PDW BLD-RTO: 13.8 % (ref 11.8–14.4)
PLATELET # BLD: 231 K/UL (ref 138–453)
PLATELET ESTIMATE: ABNORMAL
PMV BLD AUTO: 8.8 FL (ref 8.1–13.5)
POTASSIUM SERPL-SCNC: 3.2 MMOL/L (ref 3.7–5.3)
RBC # BLD: 4.27 M/UL (ref 3.95–5.11)
RBC # BLD: ABNORMAL 10*6/UL
SEG NEUTROPHILS: 65 % (ref 36–65)
SEGMENTED NEUTROPHILS ABSOLUTE COUNT: 4.25 K/UL (ref 1.5–8.1)
SODIUM BLD-SCNC: 136 MMOL/L (ref 135–144)
WBC # BLD: 6.6 K/UL (ref 3.5–11.3)
WBC # BLD: ABNORMAL 10*3/UL

## 2021-10-14 PROCEDURE — 80048 BASIC METABOLIC PNL TOTAL CA: CPT

## 2021-10-14 PROCEDURE — 36415 COLL VENOUS BLD VENIPUNCTURE: CPT

## 2021-10-14 PROCEDURE — 6370000000 HC RX 637 (ALT 250 FOR IP): Performed by: NURSE PRACTITIONER

## 2021-10-14 PROCEDURE — 6370000000 HC RX 637 (ALT 250 FOR IP): Performed by: STUDENT IN AN ORGANIZED HEALTH CARE EDUCATION/TRAINING PROGRAM

## 2021-10-14 PROCEDURE — 99232 SBSQ HOSP IP/OBS MODERATE 35: CPT | Performed by: STUDENT IN AN ORGANIZED HEALTH CARE EDUCATION/TRAINING PROGRAM

## 2021-10-14 PROCEDURE — 6370000000 HC RX 637 (ALT 250 FOR IP): Performed by: INTERNAL MEDICINE

## 2021-10-14 PROCEDURE — 83735 ASSAY OF MAGNESIUM: CPT

## 2021-10-14 PROCEDURE — 97535 SELF CARE MNGMENT TRAINING: CPT

## 2021-10-14 PROCEDURE — 70460 CT HEAD/BRAIN W/DYE: CPT

## 2021-10-14 PROCEDURE — 85025 COMPLETE CBC W/AUTO DIFF WBC: CPT

## 2021-10-14 PROCEDURE — 99232 SBSQ HOSP IP/OBS MODERATE 35: CPT | Performed by: PSYCHIATRY & NEUROLOGY

## 2021-10-14 PROCEDURE — 97166 OT EVAL MOD COMPLEX 45 MIN: CPT

## 2021-10-14 PROCEDURE — 2580000003 HC RX 258: Performed by: NURSE PRACTITIONER

## 2021-10-14 PROCEDURE — 6360000002 HC RX W HCPCS: Performed by: NURSE PRACTITIONER

## 2021-10-14 PROCEDURE — 2060000000 HC ICU INTERMEDIATE R&B

## 2021-10-14 PROCEDURE — 6360000004 HC RX CONTRAST MEDICATION: Performed by: STUDENT IN AN ORGANIZED HEALTH CARE EDUCATION/TRAINING PROGRAM

## 2021-10-14 PROCEDURE — 94640 AIRWAY INHALATION TREATMENT: CPT

## 2021-10-14 RX ORDER — POTASSIUM CHLORIDE 20 MEQ/1
40 TABLET, EXTENDED RELEASE ORAL PRN
Status: DISCONTINUED | OUTPATIENT
Start: 2021-10-14 | End: 2021-10-15 | Stop reason: HOSPADM

## 2021-10-14 RX ORDER — POTASSIUM CHLORIDE 7.45 MG/ML
10 INJECTION INTRAVENOUS PRN
Status: DISCONTINUED | OUTPATIENT
Start: 2021-10-14 | End: 2021-10-15 | Stop reason: HOSPADM

## 2021-10-14 RX ORDER — FLUTICASONE PROPIONATE 110 UG/1
2 AEROSOL, METERED RESPIRATORY (INHALATION) 2 TIMES DAILY
Status: DISCONTINUED | OUTPATIENT
Start: 2021-10-14 | End: 2021-10-15 | Stop reason: HOSPADM

## 2021-10-14 RX ORDER — ALBUTEROL SULFATE 2.5 MG/3ML
2.5 SOLUTION RESPIRATORY (INHALATION) EVERY 4 HOURS PRN
Status: DISCONTINUED | OUTPATIENT
Start: 2021-10-14 | End: 2021-10-15 | Stop reason: HOSPADM

## 2021-10-14 RX ORDER — HYDRALAZINE HYDROCHLORIDE 25 MG/1
25 TABLET, FILM COATED ORAL EVERY 12 HOURS SCHEDULED
Status: DISCONTINUED | OUTPATIENT
Start: 2021-10-14 | End: 2021-10-15

## 2021-10-14 RX ORDER — POTASSIUM BICARBONATE 25 MEQ/1
50 TABLET, EFFERVESCENT ORAL PRN
Status: DISCONTINUED | OUTPATIENT
Start: 2021-10-14 | End: 2021-10-15 | Stop reason: HOSPADM

## 2021-10-14 RX ADMIN — ALLOPURINOL 300 MG: 300 TABLET ORAL at 09:06

## 2021-10-14 RX ADMIN — ESCITALOPRAM OXALATE 20 MG: 10 TABLET ORAL at 09:06

## 2021-10-14 RX ADMIN — HYDRALAZINE HYDROCHLORIDE 25 MG: 25 TABLET ORAL at 14:46

## 2021-10-14 RX ADMIN — IOPAMIDOL 75 ML: 755 INJECTION, SOLUTION INTRAVENOUS at 14:14

## 2021-10-14 RX ADMIN — DULOXETINE HYDROCHLORIDE 60 MG: 30 CAPSULE, DELAYED RELEASE ORAL at 18:50

## 2021-10-14 RX ADMIN — LURASIDONE HYDROCHLORIDE 40 MG: 40 TABLET, FILM COATED ORAL at 20:56

## 2021-10-14 RX ADMIN — BENZTROPINE MESYLATE 0.5 MG: 0.5 TABLET ORAL at 09:06

## 2021-10-14 RX ADMIN — FLUTICASONE PROPIONATE 2 PUFF: 110 AEROSOL, METERED RESPIRATORY (INHALATION) at 08:59

## 2021-10-14 RX ADMIN — POTASSIUM CHLORIDE 40 MEQ: 1500 TABLET, EXTENDED RELEASE ORAL at 06:56

## 2021-10-14 RX ADMIN — CETIRIZINE HYDROCHLORIDE 10 MG: 10 TABLET ORAL at 09:06

## 2021-10-14 RX ADMIN — PROPRANOLOL HYDROCHLORIDE 10 MG: 10 TABLET ORAL at 20:56

## 2021-10-14 RX ADMIN — ENOXAPARIN SODIUM 40 MG: 40 INJECTION SUBCUTANEOUS at 09:06

## 2021-10-14 RX ADMIN — HYDROCODONE BITARTRATE AND ACETAMINOPHEN 1 TABLET: 5; 325 TABLET ORAL at 17:56

## 2021-10-14 RX ADMIN — LISINOPRIL AND HYDROCHLOROTHIAZIDE 1 TABLET: 25; 20 TABLET ORAL at 09:06

## 2021-10-14 RX ADMIN — LAMOTRIGINE 300 MG: 100 TABLET ORAL at 09:06

## 2021-10-14 RX ADMIN — FLUTICASONE PROPIONATE 2 PUFF: 110 AEROSOL, METERED RESPIRATORY (INHALATION) at 19:46

## 2021-10-14 RX ADMIN — BENZTROPINE MESYLATE 0.5 MG: 0.5 TABLET ORAL at 20:56

## 2021-10-14 RX ADMIN — TRAZODONE HYDROCHLORIDE 100 MG: 100 TABLET ORAL at 20:56

## 2021-10-14 RX ADMIN — PROPRANOLOL HYDROCHLORIDE 10 MG: 10 TABLET ORAL at 09:06

## 2021-10-14 RX ADMIN — SODIUM CHLORIDE, PRESERVATIVE FREE 10 ML: 5 INJECTION INTRAVENOUS at 20:56

## 2021-10-14 RX ADMIN — SODIUM CHLORIDE, PRESERVATIVE FREE 10 ML: 5 INJECTION INTRAVENOUS at 09:10

## 2021-10-14 ASSESSMENT — PAIN DESCRIPTION - PAIN TYPE: TYPE: ACUTE PAIN

## 2021-10-14 ASSESSMENT — PAIN DESCRIPTION - LOCATION: LOCATION: TOE (COMMENT WHICH ONE)

## 2021-10-14 ASSESSMENT — PAIN SCALES - GENERAL
PAINLEVEL_OUTOF10: 7
PAINLEVEL_OUTOF10: 6

## 2021-10-14 ASSESSMENT — PAIN DESCRIPTION - DESCRIPTORS: DESCRIPTORS: ACHING;DISCOMFORT

## 2021-10-14 ASSESSMENT — PAIN DESCRIPTION - ORIENTATION: ORIENTATION: RIGHT

## 2021-10-14 ASSESSMENT — PAIN - FUNCTIONAL ASSESSMENT: PAIN_FUNCTIONAL_ASSESSMENT: ACTIVITIES ARE NOT PREVENTED

## 2021-10-14 NOTE — PROGRESS NOTES
Occupational Therapy   Occupational Therapy Initial Assessment  Date: 10/14/2021   Patient Name: Goldie Anglin  MRN: [id number]     : 1965     Chief Complaint   Patient presents with    Altered Mental Status     LKW 1 hour pta       Date of Service: 10/14/2021    Discharge Recommendations:  Patient would benefit from continued therapy after discharge  OT Equipment Recommendations  Equipment Needed: Yes  Mobility Devices: Hamzah Simpers: Rolling    Assessment   Performance deficits / Impairments: Decreased functional mobility ; Decreased safe awareness;Decreased balance;Decreased endurance;Decreased ADL status; Decreased high-level IADLs  Assessment: Pt agreeable to OT eval this date. Pt completed bed mobility with SBA with HOB elevated. Pt completed functional transfers/functional mobility with CGA and use of RW, demonstrating moderate impulsivity requiring occasional cues to await therapist's instructions prior to activity. Pt c/o SOB with exertion however vitals remained WFL. Pt verbalized WB restrictions as WBAT through R heel only (d/t recent foot sx) and pt able to maintain throughout session. Pt completed LB dressing and UB dressing with SBA and setup. Pt completed toileting task requiring CGA for transfer and clothing management tasks. Pt will benefit from continued OT services to address deficits in endurance, balance, and safety awareness to improve independence with ADLs/IADLs and functional transfers/mobility. Prognosis: Good  Decision Making: Medium Complexity  Patient Education: Pt ed on OT role, OT POC, safety awareness, DME use, EC/WS, and importance of continued OT.  Pt verbalized good understanding  REQUIRES OT FOLLOW UP: Yes  Activity Tolerance  Activity Tolerance: Patient Tolerated treatment well  Safety Devices  Safety Devices in place: Yes  Type of devices: Left in bed;Gait belt;Call light within reach  Restraints  Initially in place: No           Patient Diagnosis(es): The primary encounter diagnosis was Altered mental status, unspecified altered mental status type. A diagnosis of Hypertensive urgency was also pertinent to this visit. has a past medical history of Asthma, Borderline diabetes, Depression, Fibromyalgia, Herpes simplex without mention of complication, Hypertensive urgency, and Sleep apnea. has a past surgical history that includes Cholecystectomy (1990); Tonsillectomy; Tubal ligation (1992); back surgery (Cage and Screws); Foot Tendon Surgery; Dilation & curettage (11/14/11); hysteroscopy (11/14/11); kanu and bso (cervix removed) (12/29/11); Hysterectomy (12/29/11); Enterocele repair (12/29/11); Abdominal adhesion surgery (12/29/11); and back surgery (2/24/12). Restrictions  Restrictions/Precautions  Restrictions/Precautions: Bedrest with Bathroom Privileges, Fall Risk, Seizure  Required Braces or Orthoses?: No  Position Activity Restriction  Other position/activity restrictions: per pt, allowed to WB through R heel only since foot sx; maintained throughout session    Subjective   General  Patient assessed for rehabilitation services?: Yes  Family / Caregiver Present: Yes ( present)  General Comment  Comments: RN ok'd pt for OT eval this date. Pt agreeable to session and pleasant/cooperative throughout  Patient Currently in Pain: Yes  Pain Assessment  Pain Assessment: 0-10  Pain Level: 6  Pain Type: Acute pain  Pain Location: Toe (Comment which one)  Pain Orientation: Right  Pain Descriptors: Aching;Discomfort  Functional Pain Assessment: Activities are not prevented  Non-Pharmaceutical Pain Intervention(s): Ambulation/Increased Activity; Distraction; Therapeutic presence  Response to Pain Intervention: Patient Satisfied  Pre Treatment Pain Screening  Intervention List: Patient able to continue with treatment  Vital Signs  Patient Currently in Pain: Yes     Social/Functional History  Social/Functional History  Lives With: Spouse  Type of Home: [facility] Layout: Two level, Bed/Bath upstairs, 1/2 bath on main level (bedroom upstairs, bathroom mainly used in basement)  Home Access: Stairs to enter with rails  Entrance Stairs - Number of Steps: 4  Entrance Stairs - Rails: Right  Bathroom Shower/Tub: Tub only, Walk-in shower (pt reports tub upstairs and walk-in shower in basement)  Bathroom Toilet: Standard  Bathroom Equipment: Shower chair  Home Equipment: Elsi Willoughby, 4 wheeled walker (pt reports typically using knee scooter d/t recent foot sx)  Receives Help From: Family  ADL Assistance: Independent (pt reports has been completing sponge baths sinkside since foot sx)  Homemaking Assistance: Independent  Homemaking Responsibilities: No ( has been completing since sx)  Ambulation Assistance: Independent  Transfer Assistance: Independent  Active : Yes (pt reports  has been performing driving d/t foot sx)  Mode of Transportation: Car  Occupation: Unemployed  Leisure & Hobbies: make GROUND[employer], bobby  Additional Comments: pt  works however has 3 sons in area that pt could call for assistance if needed       Objective   Vision: Within Functional Limits  Hearing: Within functional limits         Balance  Sitting Balance: Stand by assistance  Standing Balance: Contact guard assistance  Standing Balance  Time: ~2 min  Activity: standing EOB in prep for functional mobility and sinkside for hygiene tasks  Functional Mobility  Functional - Mobility Device: Rolling Walker  Activity: To/from bathroom  Assist Level: Contact guard assistance  Functional Mobility Comments: Pt completed functional mobility from EOB <> bathroom with RW and CGA for safety d/t decreased balance and safety awareness throughout  Toilet Transfers  Toilet - Technique: Stand step  Equipment Used: Standard toilet  Toilet Transfer: Contact guard assistance  Toilet Transfers Comments: 1 VC for proper hand placement with fair carryover     ADL  Feeding: Modified independent ;Setup  Grooming: Modified independent ;Setup (pt completed hand hygiene standing sinkside with setup provided)  UE Bathing: Setup;Stand by assistance  LE Bathing: Setup;Stand by assistance  UE Dressing: Setup;Stand by assistance (pt doffed dirty and donned clean gown while seated on toilet with SBA for safety and for appropriate threading)  LE Dressing: Setup;Stand by assistance (pt donned L sock with SBA for safety with dynamic sitting; completed in figure 4 position)  Toileting: Contact guard assistance (CGA for clothing management and pericare tasks)  Tone RUE  RUE Tone: Normotonic  Tone LUE  LUE Tone: Normotonic  Coordination  Movements Are Fluid And Coordinated: Yes    Bed mobility  Supine to Sit: Stand by assistance  Sit to Supine: Stand by assistance  Scooting: Stand by assistance  Comment: HOB elevated ~45 degrees throughout  Transfers  Sit to stand: Contact guard assistance  Stand to sit: Contact guard assistance  Transfer Comments: completed 2X; initially impulsive requiring VCs to await therapist's readiness    Cognition  Overall Cognitive Status: Exceptions  Safety Judgement: Decreased awareness of need for safety;Decreased awareness of need for assistance       Sensation  Overall Sensation Status: WFL        LUE AROM (degrees)  LUE General AROM: shoulder flexion limited to 0-120; all other joints WFL  Left Hand AROM (degrees)  Left Hand AROM: WFL  RUE AROM (degrees)  RUE AROM : WFL  Right Hand AROM (degrees)  Right Hand AROM: WFL  LUE Strength  Gross LUE Strength: WFL  L Hand General: 4+/5  LUE Strength Comment: grossly 4+/5  RUE Strength  Gross RUE Strength: WFL  R Hand General: 4+/5  RUE Strength Comment: grossly 4+/5                   Plan   Plan  Times per week: 3 x/wk  Current Treatment Recommendations: Balance Training, Functional Mobility Training, Endurance Training, Safety Education & Training, Patient/Caregiver Education & Training, Equipment Evaluation, Education, & procurement, Self-Care / ADL, Home Management Training    AM-PAC Score        AM-PAC Inpatient Daily Activity Raw Score: 20 (10/14/21 1733)  AM-PAC Inpatient ADL T-Scale Score : 42.03 (10/14/21 1733)  ADL Inpatient CMS 0-100% Score: 38.32 (10/14/21 1733)  ADL Inpatient CMS G-Code Modifier : Nash Schultz (10/14/21 1733)    Goals  Short term goals  Time Frame for Short term goals: By discharge, pt will:  Short term goal 1: Demo Mod I with use of LRD for functional transfers and functional mobility  Short term goal 2: Demo I with UB ADLs, LB ADLs and toileting tasks  Short term goal 3: Demo 10+ minutes dynamic standing and reaching outside JATIN with SUP to increase safety and independence with ADLs/IADLs  Short term goal 4: Demo good safety awareness throughout therapy session with 0 VCs  Short term goal 5: Verbalize/demonstrate 2+ EC/WS tech to incorporate throughout daily ADL/IADL routines       Therapy Time   Individual Concurrent Group Co-treatment   Time In 1651         Time Out 1713         Minutes 22         Timed Code Treatment Minutes: 2129 Reji Alanis OTR/L

## 2021-10-14 NOTE — PROGRESS NOTES
Neurology follow-up note    10/14/2021      Chief complaint patient is being evaluated for seizure altered mental status    HPI    Patient has been in the room at time of evaluation. Patient has been doing well over the past 24 hours. She is awake and alert. She has been up to the bathroom. She complains of no headache or other neurologic dysfunction at this time. Patient appears to have had hypertensive encephalopathy which appears to be resolving at this time. She did note however that she remains significantly hypertensive. I discussed this case with hospitalist at this time. Patient is on Lamictal for behavioral issues. Neuro exam    Blood pressure (!) 190/110, pulse 86, temperature 98.6 °F (37 °C), temperature source Oral, resp. rate 17, last menstrual period 11/29/2011, SpO2 92 %, not currently breastfeeding. Mental status: Awake alert and oriented x3. Memory is grossly intact. Language shows normal reception expression repetition. Insight and judgment appears to be adequate. CN II through XII: Pupils are equal and round. Extraocular muscles intact. No nystagmus. Motor and sensory function face is intact. Phonation and swallowing appears intact. Motor: No focal weakness identified on today's examination. Cerebellar: Normal finger-to-nose maneuver is noted. No tremors or ataxia. Reflexes: 1+ bilateral symmetric at biceps triceps and knees. Sensory no sensory deficit identified. Station and gait not tested at this time. Impression  1. Hypertensive crisis with apparent provoked seizure. 2.  Patient appears to be recovering well at this point time. Recommendations  1. MRI pending at this time. 2.  Hospitalist to manage hypertension.     Electronically signed by Ross Montgomery MD on 10/14/2021 at 10:01 AM

## 2021-10-14 NOTE — PROGRESS NOTES
[facility]  Office: [address], DO, Alfredo Hardy, DO, Lisa Baigo, DO, Jersey Feng Blood, DO, Cordella Holstein, MD, Jimmie Koenig MD, Armando Lomeli MD, Dejon Dugan MD, Renella Boeck, MD, Wolf Contreras MD, Pete White MD, Tania Cates MD, Liz Avila, DO, Heidi Manzano, DO, Barbara Leslie MD,  Tami Lovelace DO, Linda Pulliam MD, Celine Herron MD, Bladimir Neumann MD, Mariela Pereyra MD, Bigg Caceres MD, Mikal Granda MD, Roselia Owens, [facility], [facility], CNP, Gracy Paris, CNP, Humberto Muhammad, CNS, Augusto Rodrigez, CNP, Roxie Herrera, CNP, Honey Luevano, CNP, Sandra Go, CNP, Jere Wright, CNP, Serge Page, CNP, Elsie Schulz PA-C, Chris Szymanski, TIARA, Huey Hernandez, CNP, Brandon Cotto, CNP, Cecilio Gallardo, CNP, Dilan Mendes, CNP, Katrina Heller, CNP, Bonilla Pennington, CNP, Jonathon Lara, [address] [place] [address]    Progress Note    10/14/2021    9:49 AM    Name:   Amy Snider  MRN:     [id number]     Acct:      [de-identified]   Room:   [address] [place] [address] Day:  2  Admit Date:  10/12/2021  7:39 PM    PCP:   Whit Concepcion MD  Code Status:  Full Code    Subjective:     C/C:   Chief Complaint   Patient presents with   Merlyn Thompsonls Altered Mental Status     LKW 1 hour pta     Interval History Status: improved. Patient was seen and examined at bedside this AM. She is alert and oriented. BP is better controlled. Nicardipine infusion stopped. The patient reports feeling \"okay\" and denies fever/chills, nausea/vomiting, shortness of breath or chest pain. She endorses dizziness with ambulation. MRI brain pending. Writer reached out to [employer] this AM. Plan to have a company representative turn narcotic pump off this morning. Brief History:     (per HPI) Amy Snider is a 55 y.o.   female who initially presented to [facility] on 10/12/2021 for evaluation of syncopal episode with questionable seizure-like activity. Patient's  had left the house to go to a convenience store. He believes that he was gone for less than 10 minutes. Prior to leaving, patient was awake and alert. After his return, the patient was unresponsive and with blood dripping from her mouth. There was questionable shaking as well. Patient does not recall the event. Her comorbidities include a history of degenerative disc disease of the lumbar spine status post pain pump implantation, fibromyalgia, depression, recent bunionectomy, and essential hypertension. EMS was called and patient was brought to the emergency department.     Of note, the patient has been without her blood pressure medications for several days. Blood pressure at home was noted to be over 000 systolic. This was confirmed in the emergency department. CT of the head was performed which revealed an ill-defined area of a vague hypodensity in the left occipital lobe. Patient was a started on Cardene infusion. Review of Systems:     Constitutional:  negative for chills, fevers, sweats  Respiratory:  negative for cough, dyspnea on exertion, shortness of breath, wheezing  Cardiovascular:  negative for chest pain, chest pressure/discomfort, lower extremity edema, palpitations  Gastrointestinal:  negative for abdominal pain, constipation, diarrhea, nausea, vomiting  Neurological:  Positive for dizziness. Medications: Allergies:     Allergies   Allergen Reactions    Bactrim Anaphylaxis    Demerol Anaphylaxis    Lyrica [Pregabalin] Other (See Comments)     Suicidal thoughts    Pcn [Penicillins] Other (See Comments)     Reaction as child       Current Meds:   Scheduled Meds:    fluticasone  2 puff Inhalation BID    sodium chloride flush  5-40 mL IntraVENous 2 times per day    enoxaparin  40 mg SubCUTAneous Daily    allopurinol  300 mg Oral Daily    benztropine  0.5 mg Oral BID    DULoxetine  120 mg Oral QPM    escitalopram  20 mg Oral Daily    lamoTRIgine  300 mg Oral Daily    lisinopril-hydroCHLOROthiazide  1 tablet Oral Daily    cetirizine  10 mg Oral Daily    propranolol  10 mg Oral BID    traZODone  100 mg Oral Nightly     Continuous Infusions:    sodium chloride       PRN Meds: potassium chloride **OR** potassium alternative oral replacement **OR** potassium chloride, albuterol, sodium chloride flush, sodium chloride, ondansetron **OR** ondansetron, polyethylene glycol, acetaminophen **OR** acetaminophen, LORazepam, HYDROcodone 5 mg - acetaminophen    Data:     Past Medical History:   has a past medical history of Asthma, Borderline diabetes, Depression, Fibromyalgia, Herpes simplex without mention of complication, Hypertensive urgency, and Sleep apnea. Social History:   reports that she has never smoked. She has never used smokeless tobacco. She reports current alcohol use. She reports that she does not use drugs.      Family History:   Family History   Problem Relation Age of Onset    Depression Mother     ADHD Neg Hx     Alcohol Abuse Neg Hx     Allergic Rhinitis Neg Hx     Allergies Neg Hx     Allergy (Severe) Neg Hx     Alzheimer's Disease Neg Hx     Amblyopia Neg Hx     Anemia Neg Hx     Anesth Problems Neg Hx     Angioedema Neg Hx     Anxiety Disorder Neg Hx     Arrhythmia Neg Hx     Arthritis Neg Hx     Asthma Neg Hx     Ataxia Neg Hx     Atopy Neg Hx     Bipolar Disorder Neg Hx     Birth Defects Neg Hx     Bleeding Prob Neg Hx     Blindness Neg Hx     Brain Cancer Neg Hx     BRCA 1/2 Neg Hx     Breast Cancer Neg Hx     Broken Bones Neg Hx     Cancer Neg Hx     Cataracts Neg Hx     Celiac Disease Neg Hx     Cervical Cancer Neg Hx     Chdhd Hrt Surg Neg Hx     Chdhd Resp Dis Neg Hx     Chorea Neg Hx     Chronic Infections Neg Hx     Cirrhosis Neg Hx     Clotting Disorder Neg Hx     Collagen Disease Neg Hx     Colon Cancer Neg Hx     Colon Polyps Neg Hx     COPD Neg Hx     Coronary Art Dis Neg Hx  Cowden Syndrome Neg Hx     Crohn's Disease Neg Hx     Cystic Fibrosis Neg Hx     Dementia Neg Hx     JONATHAN Usage Neg Hx     Diabetes Neg Hx     Dislocations Neg Hx     Down Syndrome Neg Hx     Drug Abuse Neg Hx     Early Death Neg Hx     Eclampsia Neg Hx     Eczema Neg Hx     Elevated Lipids Neg Hx     Emphysema Neg Hx     Endometrial Cancer Neg Hx     Esophageal Cancer Neg Hx     Fainting Neg Hx     Glaucoma Neg Hx      Problems Neg Hx     Hearing Loss Neg Hx     Heart Attack Neg Hx     Heart Defect Neg Hx     Heart Disease Neg Hx     Heart Failure Neg Hx     Heart Surgery Neg Hx     Hemochromatosis Neg Hx     High Blood Pressure Neg Hx     High Cholesterol Neg Hx     Hypertension Neg Hx     Hypotension Neg Hx     Immunodeficiency Neg Hx     Infertility Neg Hx     Inflam Bowel Dis Neg Hx     Irritable Bowel Syndrome Neg Hx     Kidney Cancer Neg Hx     Kidney Disease Neg Hx     Learning Disabilities Neg Hx     Li-Fraumeni Syndrome Neg Hx     Liver Cancer Neg Hx     Liver Disease Neg Hx     Lung Cancer Neg Hx     Lupus Neg Hx     Macular Degen Neg Hx     Malig Hyperten Neg Hx     Malig Hypertherm Neg Hx     Marfan Syndrome Neg Hx     Memory Loss Neg Hx     Mental Illness Neg Hx     Mental Retardation Neg Hx     Migraines Neg Hx     Miscarriages / Stillbirths Neg Hx     Mult Sclerosis Neg Hx     Neurofibromatosis Neg Hx     Neuropathy Neg Hx     Obesity Neg Hx     OCD Neg Hx     Osteoarthritis Neg Hx     Osteoporosis Neg Hx     Other Neg Hx     Ovarian Cancer Neg Hx     Pacemaker Neg Hx     Paranoid Behavior Neg Hx     Parkinsonism Neg Hx     Physical Abuse Neg Hx     PKU Neg Hx     Premature Birth Neg Hx      Labor Neg Hx     Prostate Cancer Neg Hx     Pseudochol.  Deficiency Neg Hx     Psoriasis Neg Hx     Rashes/Skin Problems Neg Hx     Rectal Cancer Neg Hx     Retinal Detachment Neg Hx     Rheum Arthritis Neg Hx     Rheumatologic Disease Neg Hx     Schizophrenia Neg Hx     Scoliosis Neg Hx     Seizures Neg Hx     Severe Sprains Neg Hx     Sexual Abuse Neg Hx     Sickle Cell Anemia Neg Hx     Sickle Cell Trait Neg Hx     SIDS Neg Hx     Spont Abortions Neg Hx     Stillborn Neg Hx     Stomach Cancer Neg Hx     Strabismus Neg Hx     Stroke Neg Hx     Substance Abuse Neg Hx     Sudden Death Neg Hx     Thyroid Cancer Neg Hx     Thyroid Disease Neg Hx     Tuberculosis Neg Hx     Ulcerative Colitis Neg Hx     Urolithiasis Neg Hx     Urticaria Neg Hx     Uterine Cancer Neg Hx     Vaginal Cancer Neg Hx     Vision Loss Neg Hx     Farooq's Disease Neg Hx        Vitals:  /75   Pulse 52   Temp 98.6 °F (37 °C) (Oral)   Resp 18   LMP 2011   SpO2 92%   Temp (24hrs), Av.3 °F (36.8 °C), Min:98.1 °F (36.7 °C), Max:98.6 °F (37 °C)    No results for input(s): POCGLU in the last 72 hours. I/O (24Hr):     Intake/Output Summary (Last 24 hours) at 10/14/2021 0949  Last data filed at 10/14/2021 0518  Gross per 24 hour   Intake 790 ml   Output 1750 ml   Net -960 ml       Labs:  Hematology:  Recent Labs     10/12/21  2000 10/13/21  0503 10/14/21  0544   WBC 11.6* 9.9 6.6   RBC 4.85 4.31 4.27   HGB 13.7 12.0 11.9   HCT 42.4 37.9 39.2   MCV 87.4 87.9 91.8   MCH 28.2 27.8 27.9   MCHC 32.3 31.7 30.4   RDW 13.5 13.6 13.8    243 231   MPV 8.6 8.3 8.8   INR  --  1.1  --      Chemistry:  Recent Labs     10/12/21  2000 10/13/21  0503 10/14/21  0544    137 136   K 3.9 3.5* 3.2*   CL 97* 98 100   CO2 21 25 25   GLUCOSE 197* 133* 117*   BUN 14 14 17   CREATININE 0.84 0.80 0.85   MG  --  2.2 2.2   ANIONGAP 19* 14 11   LABGLOM >60 >60 >60   GFRAA >60 >60 >60   CALCIUM 9.9 8.9 9.2     Recent Labs     10/12/21  2000 10/13/21  0503   PROT 8.1 6.8   LABALBU 5.2 4.6   AST 27 19   ALT 39* 31   ALKPHOS 83 68   BILITOT 0.61 0.49     ABG:No results found for: POCPH, PHART, PH, POCPCO2, OCQ3BCZ, PCO2, POCPO2, PO2ART, PO2, POCHCO3, WDU8INM, HCO3, NBEA, PBEA, BEART, BE, THGBART, THB, MMA3JBX, ZUGV4AIO, D1BKUKVQ, O2SAT, FIO2  Lab Results   Component Value Date/Time    SPECIAL NOT REPORTED 12/22/2011 08:59 AM     Lab Results   Component Value Date/Time    CULTURE UROGENITAL QUINCY 10-50,000 CFU/ML 12/22/2011 08:59 AM    CULTURE  Performed at [facility] 12/22/2011 08:59 AM       Radiology:  XR FOOT RIGHT (MIN 3 VIEWS)    Result Date: 10/13/2021  Status post suspected bunionectomy with displaced distal osteotomy fragment; the 1st metatarsal head fragment is laterally dislocated relative to the 1st proximal phalangeal base. CT HEAD WO CONTRAST    Result Date: 10/12/2021  Ill-defined area of vague hypodensity noted in the left occipital lobe. Given the history of new onset seizure, evaluation with contrast-enhanced CT or MRI would be helpful. XR CHEST PORTABLE    Result Date: 10/12/2021  No radiographic evidence of acute cardiopulmonary disease.        Physical Examination:        General appearance:  alert, cooperative and no distress  Mental Status:  oriented to person, place and time and normal affect  Lungs:  clear to auscultation bilaterally, normal effort  Heart:  regular rate and rhythm, no murmur  Abdomen:  soft, nontender, nondistended, normal bowel sounds, no masses, hepatomegaly, splenomegaly  Extremities:  no edema, redness, tenderness in the calves  Skin:  no gross lesions, rashes, induration    Assessment:        Hospital Problems         Last Modified POA    * (Principal) Hypertensive urgency 10/13/2021 Yes    History of lumbosacral spine surgery 10/13/2021 Yes    Overview Signed 12/13/2011 10:14 AM by Lilly Haywood DO     Cage and Screws         Sleep apnea 10/13/2021 Yes    Overview Signed 12/13/2011 10:15 AM by Lilly Haywood DO     To bring with any hospitalization         Seizure-like activity ([facility] [address] 10/13/2021 Yes    Essential hypertension 10/13/2021 Yes    Hypokalemia 10/13/2021 Yes    Fibromyalgia 10/13/2021 Yes    Depression 10/13/2021 Yes          Plan:        Hypertensive emergency, resolved   -Stop nicardipine infusion   -Continue home lisinopril-HCTZ 20-25   -Continue propranolol 10 mg tid   -MRI brain pending     New-onset seizure   -Likely 2/2 hypertensive emergency as above   -CT head showing an ill-defined area of vague hypodensity in the left occipital lobe (concern for PRES vs. Underlying mass)   -MRI brain pending (awaiting pain pump deactivation)   -Continue home lamotrigine (no additional AEDs per neurology)     Dizziness   -Secondary to hypertension and/or underlying occipital lobe lesion seen on CT   -MRI brain pending     Depression   -Continue home lamotrigine 300 mg daily as above   -Continue duloxetine 120 mg daily   -Continue home trazodone 100 mg nightly     Hypokalemia   -Potassium replacement per protocol     Hx of gout  -Continue home allopurinol     Hx of asthma   -Not in acute exacerbation   -PRN albuterol       Madalyn Mora MD  10/14/2021  9:49 AM

## 2021-10-15 VITALS
RESPIRATION RATE: 12 BRPM | TEMPERATURE: 98.2 F | SYSTOLIC BLOOD PRESSURE: 149 MMHG | OXYGEN SATURATION: 93 % | HEART RATE: 76 BPM | DIASTOLIC BLOOD PRESSURE: 109 MMHG

## 2021-10-15 LAB
ABSOLUTE EOS #: 0.26 K/UL (ref 0–0.44)
ABSOLUTE IMMATURE GRANULOCYTE: 0.04 K/UL (ref 0–0.3)
ABSOLUTE LYMPH #: 1.24 K/UL (ref 1.1–3.7)
ABSOLUTE MONO #: 0.71 K/UL (ref 0.1–1.2)
ANION GAP SERPL CALCULATED.3IONS-SCNC: 13 MMOL/L (ref 9–17)
BASOPHILS # BLD: 1 % (ref 0–2)
BASOPHILS ABSOLUTE: 0.04 K/UL (ref 0–0.2)
BUN BLDV-MCNC: 19 MG/DL (ref 6–20)
BUN/CREAT BLD: ABNORMAL (ref 9–20)
CALCIUM SERPL-MCNC: 9.3 MG/DL (ref 8.6–10.4)
CHLORIDE BLD-SCNC: 102 MMOL/L (ref 98–107)
CO2: 26 MMOL/L (ref 20–31)
CREAT SERPL-MCNC: 0.89 MG/DL (ref 0.5–0.9)
DIFFERENTIAL TYPE: ABNORMAL
EOSINOPHILS RELATIVE PERCENT: 5 % (ref 1–4)
GFR AFRICAN AMERICAN: >60 ML/MIN
GFR NON-AFRICAN AMERICAN: >60 ML/MIN
GFR SERPL CREATININE-BSD FRML MDRD: ABNORMAL ML/MIN/{1.73_M2}
GFR SERPL CREATININE-BSD FRML MDRD: ABNORMAL ML/MIN/{1.73_M2}
GLUCOSE BLD-MCNC: 119 MG/DL (ref 70–99)
HCT VFR BLD CALC: 39.1 % (ref 36.3–47.1)
HEMOGLOBIN: 12 G/DL (ref 11.9–15.1)
IMMATURE GRANULOCYTES: 1 %
LYMPHOCYTES # BLD: 22 % (ref 24–43)
MCH RBC QN AUTO: 27.8 PG (ref 25.2–33.5)
MCHC RBC AUTO-ENTMCNC: 30.7 G/DL (ref 28.4–34.8)
MCV RBC AUTO: 90.7 FL (ref 82.6–102.9)
MONOCYTES # BLD: 13 % (ref 3–12)
NRBC AUTOMATED: 0 PER 100 WBC
PDW BLD-RTO: 13.8 % (ref 11.8–14.4)
PLATELET # BLD: 221 K/UL (ref 138–453)
PLATELET ESTIMATE: ABNORMAL
PMV BLD AUTO: 8.6 FL (ref 8.1–13.5)
POTASSIUM SERPL-SCNC: 3.6 MMOL/L (ref 3.7–5.3)
RBC # BLD: 4.31 M/UL (ref 3.95–5.11)
RBC # BLD: ABNORMAL 10*6/UL
SEG NEUTROPHILS: 58 % (ref 36–65)
SEGMENTED NEUTROPHILS ABSOLUTE COUNT: 3.31 K/UL (ref 1.5–8.1)
SODIUM BLD-SCNC: 141 MMOL/L (ref 135–144)
WBC # BLD: 5.6 K/UL (ref 3.5–11.3)
WBC # BLD: ABNORMAL 10*3/UL

## 2021-10-15 PROCEDURE — 36415 COLL VENOUS BLD VENIPUNCTURE: CPT

## 2021-10-15 PROCEDURE — 99231 SBSQ HOSP IP/OBS SF/LOW 25: CPT | Performed by: PSYCHIATRY & NEUROLOGY

## 2021-10-15 PROCEDURE — 97530 THERAPEUTIC ACTIVITIES: CPT

## 2021-10-15 PROCEDURE — 6370000000 HC RX 637 (ALT 250 FOR IP): Performed by: STUDENT IN AN ORGANIZED HEALTH CARE EDUCATION/TRAINING PROGRAM

## 2021-10-15 PROCEDURE — 6370000000 HC RX 637 (ALT 250 FOR IP): Performed by: NURSE PRACTITIONER

## 2021-10-15 PROCEDURE — 80048 BASIC METABOLIC PNL TOTAL CA: CPT

## 2021-10-15 PROCEDURE — 6360000002 HC RX W HCPCS: Performed by: NURSE PRACTITIONER

## 2021-10-15 PROCEDURE — 99239 HOSP IP/OBS DSCHRG MGMT >30: CPT | Performed by: STUDENT IN AN ORGANIZED HEALTH CARE EDUCATION/TRAINING PROGRAM

## 2021-10-15 PROCEDURE — 6370000000 HC RX 637 (ALT 250 FOR IP): Performed by: INTERNAL MEDICINE

## 2021-10-15 PROCEDURE — 85025 COMPLETE CBC W/AUTO DIFF WBC: CPT

## 2021-10-15 PROCEDURE — 97162 PT EVAL MOD COMPLEX 30 MIN: CPT

## 2021-10-15 RX ORDER — HYDRALAZINE HYDROCHLORIDE 50 MG/1
50 TABLET, FILM COATED ORAL EVERY 12 HOURS SCHEDULED
Qty: 90 TABLET | Refills: 3 | Status: SHIPPED | OUTPATIENT
Start: 2021-10-15

## 2021-10-15 RX ORDER — ALBUTEROL SULFATE 90 UG/1
2 AEROSOL, METERED RESPIRATORY (INHALATION) EVERY 6 HOURS PRN
Qty: 18 G | Refills: 3 | Status: SHIPPED | OUTPATIENT
Start: 2021-10-15

## 2021-10-15 RX ORDER — HYDRALAZINE HYDROCHLORIDE 50 MG/1
50 TABLET, FILM COATED ORAL EVERY 12 HOURS SCHEDULED
Status: DISCONTINUED | OUTPATIENT
Start: 2021-10-15 | End: 2021-10-15 | Stop reason: HOSPADM

## 2021-10-15 RX ADMIN — LAMOTRIGINE 300 MG: 100 TABLET ORAL at 09:30

## 2021-10-15 RX ADMIN — HYDROCODONE BITARTRATE AND ACETAMINOPHEN 1 TABLET: 5; 325 TABLET ORAL at 03:20

## 2021-10-15 RX ADMIN — HYDRALAZINE HYDROCHLORIDE 50 MG: 25 TABLET, FILM COATED ORAL at 09:30

## 2021-10-15 RX ADMIN — PROPRANOLOL HYDROCHLORIDE 10 MG: 10 TABLET ORAL at 09:31

## 2021-10-15 RX ADMIN — ENOXAPARIN SODIUM 40 MG: 40 INJECTION SUBCUTANEOUS at 09:29

## 2021-10-15 RX ADMIN — CETIRIZINE HYDROCHLORIDE 10 MG: 10 TABLET ORAL at 09:31

## 2021-10-15 RX ADMIN — ALLOPURINOL 300 MG: 300 TABLET ORAL at 09:31

## 2021-10-15 RX ADMIN — ESCITALOPRAM OXALATE 20 MG: 10 TABLET ORAL at 09:30

## 2021-10-15 RX ADMIN — FLUTICASONE PROPIONATE 2 PUFF: 110 AEROSOL, METERED RESPIRATORY (INHALATION) at 08:19

## 2021-10-15 RX ADMIN — BENZTROPINE MESYLATE 0.5 MG: 0.5 TABLET ORAL at 09:31

## 2021-10-15 RX ADMIN — LISINOPRIL AND HYDROCHLOROTHIAZIDE 1 TABLET: 25; 20 TABLET ORAL at 09:30

## 2021-10-15 ASSESSMENT — PAIN SCALES - GENERAL
PAINLEVEL_OUTOF10: 0
PAINLEVEL_OUTOF10: 7

## 2021-10-15 NOTE — DISCHARGE SUMMARY
[facility]  Office: [address], DO, Leisd Swain, DO, Sheila Duane, DO, Rony Levin Blood, DO, Myron Sagastume MD, Smith Zaman MD, Ike Abebe MD, Gabriela Conner MD, Moon Espinoza MD, Julianna Paulino MD, Otoniel Shea MD, Kelly Crook MD, Shilpa Lu DO, Daniel Maciel, DO, Anna Barber MD,  Toña Banks DO, Zachary Dobbs MD, Haley Justice MD, Moriah Crook MD, Kirill White MD, Caitie Velasco MD, Karla Balderrama MD, Argenis Yusuf [facility], [facility], CNP, Eder Harvey, CNP, Tanda Lesches, CNS, Michael Hurley, CNP, Pedro Gonzalez, CNP, Maddy Dumont, CNP, Cecy Godwin, CNP, Kenia Siddiqui, CNP, Delbert Aase, CNP, Alba Kraft PA-C, Charlene Kennedy, [facility], Lisa Garg, CNP, Jelani Montaño, CNP, Lilli Shanks, CNP, Amish Baeza, CNP, Chuck Rosales, [facility], [facility], Karuna Esteban, [facility]    Discharge Summary     Patient ID: Shaheen Thompson  :  1965   MRN: [id number]     ACCOUNT:  [de-identified]   Patient's PCP: Loli Cabrera MD  Admit Date: 10/12/2021   Discharge Date: 10/15/2021     Length of Stay: 3  Code Status:  Full Code  Admitting Physician: Kirill White MD  Discharge Physician: Kirill White MD     Active Discharge Diagnoses:     Hospital Problem Lists:  Principal Problem:    Hypertensive urgency  Active Problems:    History of lumbosacral spine surgery    Sleep apnea    Seizure-like activity ([facility] [address]    Essential hypertension    Hypokalemia    Fibromyalgia    Depression    Altered mental status  Resolved Problems:    * No resolved hospital problems. *      Admission Condition:  stable     Discharged Condition: good    Hospital Stay:     Hospital Course:  Shaheen Thompson is a 55 y.o. female with a past medical history of hypertension, asthma, depression and gout who presented to the emergency department on 10/13/2021 after being found down at home.  According to the patient's , he found her unresponsive with blood dripping from her mouth. There was no witnessed seizure. In the ED, the patient was markedly hypertensive (/121) and confused. Laboratory evaluation was roughly unremarkable (at baseline for the patient). A non-contrast CT scan of the head was obtained and was remarkable for an ill-defined area of vague hypodensity in the left occipital lobe. The patient's  states that she had an MRI of the brain at [facility]  and states that this was not present. The patient was admitted to internal medicine for further management of hypertensive emergency and concern for possible seizure and/or PRES. The patient was initially placed on a nicardipine drip but was transitioned to PO medications on day 2 of hospitalization. The non-contrast CT head done in the ED was followed by a contrast CT head inpatient. The contrast CT head did not better characterize the left occipital lobe hypodensity. The patient was unable to have an MRI done inpatient due to an implanted epidural pain pump (needs to be drained and turned off by a pain management specialist). The patient's blood pressure improved throughout the course of hospitalization. Her home Prinzide was continued and she was started on hydralazine 50 mg bid. The patient is discharged today (10/15/2021) in stable condition. She is instructed to follow-up with her primary care physician and have an MRI of the brain done outpatient (patient will need to have pain pump drained and turned off prior to MRI).        Significant therapeutic interventions: Nicardipine infusion; brain imaging; home BP medication adjustment     Significant Diagnostic Studies:   Labs / Micro:  BMP:    Lab Results   Component Value Date    GLUCOSE 119 10/15/2021    GLUCOSE 95 12/22/2011     10/15/2021    K 3.6 10/15/2021     10/15/2021    CO2 26 10/15/2021    ANIONGAP 13 10/15/2021    BUN 19 10/15/2021    CREATININE 0.89 10/15/2021    BUNCRER NOT REPORTED 10/15/2021    CALCIUM 9.3 10/15/2021    LABGLOM >60 10/15/2021    GFRAA >60 10/15/2021    GFR      10/15/2021    GFR NOT REPORTED 10/15/2021       Radiology:  XR FOOT RIGHT (MIN 3 VIEWS)    Result Date: 10/13/2021  Status post suspected bunionectomy with displaced distal osteotomy fragment; the 1st metatarsal head fragment is laterally dislocated relative to the 1st proximal phalangeal base. CT HEAD WO CONTRAST    Result Date: 10/12/2021  Ill-defined area of vague hypodensity noted in the left occipital lobe. Given the history of new onset seizure, evaluation with contrast-enhanced CT or MRI would be helpful. CT HEAD W CONTRAST    Result Date: 10/14/2021  1. The small area of hypodensity involving the left occipital lobe is better visualized on the prior noncontrast CT. 2. Otherwise, no convincing acute intracranial abnormality. 3. No abnormal enhancement is seen in the brain. RECOMMENDATIONS: Further evaluation with MRI of the brain can be performed as clinically warranted assuming no contraindications. XR CHEST PORTABLE    Result Date: 10/12/2021  No radiographic evidence of acute cardiopulmonary disease. Consultations:    Consults:     Final Specialist Recommendations/Findings:   IP CONSULT TO NEUROCRITICAL CARE  IP CONSULT TO NEUROLOGY      The patient was seen and examined on day of discharge and this discharge summary is in conjunction with any daily progress note from day of discharge. Discharge plan:     Disposition: Home    Physician Follow Up:     Robson Villatoro MD  [place] [id number] [facility]  [phone]    In 1 week  Hospital follow-up; MRI brain        Requiring Further Evaluation/Follow Up POST HOSPITALIZATION/Incidental Findings: Left occipital lobe lesion (could be related to hypertensive emergency).  Needs MRI brain to rule-out mass     Diet: regular diet (low-soidium)     Activity: As tolerated    Instructions to Patient: Follow-up with your primary care physician as scheduled. Please have an MRI of the brain done outpatient. Discharge Medications:      Medication List      START taking these medications    albuterol sulfate  (90 Base) MCG/ACT inhaler  Commonly known as: Proventil HFA  Inhale 2 puffs into the lungs every 6 hours as needed for Wheezing     hydrALAZINE 50 MG tablet  Commonly known as: APRESOLINE  Take 1 tablet by mouth every 12 hours        CONTINUE taking these medications    allopurinol 300 MG tablet  Commonly known as: ZYLOPRIM     beclomethasone 80 MCG/ACT inhaler  Commonly known as: QVAR     benztropine 0.5 MG tablet  Commonly known as: COGENTIN     Claritin 10 MG tablet  Generic drug: loratadine     DULoxetine 60 MG extended release capsule  Commonly known as: CYMBALTA     lamoTRIgine 150 MG tablet  Commonly known as: LAMICTAL     Lexapro 20 MG tablet  Generic drug: escitalopram     lisinopril-hydroCHLOROthiazide 20-25 MG per tablet  Commonly known as: PRINZIDE;ZESTORETIC     oxyCODONE 5 MG immediate release tablet  Commonly known as: ROXICODONE     propranolol 10 MG tablet  Commonly known as: INDERAL     traZODone 100 MG tablet  Commonly known as: DESYREL           Where to Get Your Medications      These medications were sent to [facility] [address] [place] [address]    Phone: [phone]   · albuterol sulfate  (90 Base) MCG/ACT inhaler  · hydrALAZINE 50 MG tablet         Discharge Procedure Orders   MRI BRAIN W CONTRAST   Standing Status: Future Standing Exp. Date: 10/15/22       Time Spent on discharge is  31 mins in patient examination, evaluation, counseling as well as medication reconciliation, prescriptions for required medications, discharge plan and follow up.     Electronically signed by   Kavon Ng MD  10/15/2021  8:22 AM      Thank you Dr. Tj Agarwal MD for the opportunity to be involved in this patient's care.

## 2021-10-15 NOTE — PLAN OF CARE
Problem: Pain:  Goal: Pain level will decrease  Description: Pain level will decrease  Outcome: Met This Shift  Goal: Control of chronic pain  Description: Control of chronic pain  Outcome: Met This Shift     Problem: Falls - Risk of:  Goal: Will remain free from falls  Description: Will remain free from falls  Outcome: Met This Shift  Goal: Absence of physical injury  Description: Absence of physical injury  Outcome: Met This Shift     Problem:  Activity:  Goal: Ability to tolerate increased activity will improve  Description: Ability to tolerate increased activity will improve  Outcome: Met This Shift     Problem: Cardiac:  Goal: Hemodynamic stability will improve  Description: Hemodynamic stability will improve  Outcome: Met This Shift  Goal: Complications related to the disease process, condition or treatment will be avoided or minimized  Description: Complications related to the disease process, condition or treatment will be avoided or minimized  Outcome: Met This Shift  Goal: Cerebral tissue perfusion will improve  Description: Cerebral tissue perfusion will improve  Outcome: Met This Shift     Problem: Coping:  Goal: Ability to identify and develop effective coping behavior will improve  Description: Ability to identify and develop effective coping behavior will improve  Outcome: Met This Shift     Problem: Nutritional:  Goal: Ability to identify appropriate dietary choices will improve  Description: Ability to identify appropriate dietary choices will improve  Outcome: Met This Shift     Problem: Pain:  Goal: Control of acute pain  Description: Control of acute pain  Outcome: Ongoing     Problem: Health Behavior:  Goal: Identification of resources available to assist in meeting health care needs will improve  Description: Identification of resources available to assist in meeting health care needs will improve  Outcome: Ongoing

## 2021-10-15 NOTE — PROGRESS NOTES
CLINICAL PHARMACY NOTE: MEDS TO BEDS    Total # of Prescriptions Filled: 2   The following medications were delivered to the patient:  · Hydralazine  · Albuterol hfa    Additional Documentation:

## 2021-10-15 NOTE — PROGRESS NOTES
October 15, 2021    Neurology follow-up note    HPI hypertensive urgency    This patient had hypertensive urgency. She is admitted with possible seizure and altered mental status. Patient normally takes Lamictal for behavioral reasons. We will microliths with continued. No seizures have been noted while here in the hospital.    Patient feels that she is back to baseline. Patient has been discharged from the hospital today. Exam patient's current vital signs show blood pressure 149/109. Patient's temperature is 98.2. Pulse is 68. Respirations are 12. Patient is resting comfortably in an easy chair with her  by her side. Mental status shows that she is awake alert and oriented x3. Memory and language functions appear to be intact. Patient denies headache. Patient is moving all 4 extremities. No focal neurologic issues noted. Impression  1.  Episode of hypertensive urgency with confusion. 2.  Apparent provoked seizure without recurrence. Original event may or may not have been a seizure however history seems to indicate that it was. Recommendations  1. No new suggestions per neurology. 2.  Neurology will sign off.     Hetal Mckoy MD

## 2021-10-15 NOTE — PROGRESS NOTES
Physical Therapy    Facility/Department: [facility] 4A STEPDOWN  Initial Assessment    NAME: Raj Bhatt  : 1965  MRN: [id number]    Date of Service: 10/15/2021    Discharge Recommendations:    No further therapy required at discharge. PT Equipment Recommendations  Equipment Needed: No    Assessment   Body structures, Functions, Activity limitations: Decreased functional mobility ; Decreased endurance;Decreased balance  Assessment: Pt able to ambulate short distances, limited with recent foot surgery. Pt reports good support system. WIll benefit from continued acute PT to progress to stairs  Prognosis: Good  Decision Making: Medium Complexity  PT Education: Goals;PT Role;Gait Training  REQUIRES PT FOLLOW UP: Yes  Activity Tolerance  Activity Tolerance: Patient Tolerated treatment well       Patient Diagnosis(es): The primary encounter diagnosis was Altered mental status, unspecified altered mental status type. Diagnoses of Hypertensive urgency and Brain lesion were also pertinent to this visit. has a past medical history of Asthma, Borderline diabetes, Depression, Fibromyalgia, Herpes simplex without mention of complication, Hypertensive urgency, and Sleep apnea. has a past surgical history that includes Cholecystectomy (); Tonsillectomy; Tubal ligation (); back surgery (Cage and Screws); Foot Tendon Surgery; Dilation & curettage (11); hysteroscopy (11); kanu and bso (cervix removed) (11); Hysterectomy (11); Enterocele repair (11); Abdominal adhesion surgery (11); and back surgery (12).     Restrictions  Restrictions/Precautions  Restrictions/Precautions: Bedrest with Bathroom Privileges, Fall Risk, Seizure  Required Braces or Orthoses?: Yes  Required Braces or Orthoses  Left Lower Extremity Brace:  (wedge shoe for heel WB, recent surgery)  Position Activity Restriction  Other position/activity restrictions: per pt, allowed to WB through R heel only since foot sx; maintained throughout session  Vision/Hearing  Vision: Within Functional Limits  Hearing: Within functional limits     Subjective  General  Chart Reviewed: Yes  Patient assessed for rehabilitation services?: Yes  Family / Caregiver Present: No  Follows Commands: Within Functional Limits  Pain Screening  Patient Currently in Pain: No  Vital Signs  Patient Currently in Pain: No       Orientation  Orientation  Overall Orientation Status: Within Functional Limits  Social/Functional History  Social/Functional History  Lives With: Spouse  Type of Home: House  Home Layout: Two level, Bed/Bath upstairs, 1/2 bath on main level  Home Access: Stairs to enter with rails  Entrance Stairs - Number of Steps: 4  Entrance Stairs - Rails: Right  Bathroom Shower/Tub: Tub only, Walk-in shower  Bathroom Toilet: Standard  Bathroom Equipment: Shower chair  Home Equipment: Marcia Oliveira, 4 wheeled walker  [address] Help From: Family  ADL Assistance: Independent  Homemaking Assistance: Independent  Homemaking Responsibilities: No  Ambulation Assistance: Independent (rw)  Transfer Assistance: Independent  Active : Yes  Mode of Transportation: Car  Occupation: Unemployed  [address]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, bobby  Additional Comments: pt  works however has 3 sons in area that pt could call for assistance if needed  Cognition        Objective          AROM RLE (degrees)  RLE AROM: WFL  AROM LLE (degrees)  LLE AROM : WFL  Strength RLE  Strength RLE: WFL  Strength LLE  Strength LLE: WFL     Sensation  Overall Sensation Status: WFL  Bed mobility  Supine to Sit: Independent  Scooting: Independent  Transfers  Sit to Stand: Stand by assistance  Stand to sit: Stand by assistance  Bed to Chair: Stand by assistance  Comment: use of rw for transfers  Ambulation  Ambulation?: Yes  More Ambulation?: No  Ambulation 1  Surface: level tile  Device: Rolling Walker  Assistance: Contact guard assistance  Gait Deviations: Slow Chloe;Decreased step length  Distance: 30 ft  Comments: CGA for safety, slight dizziness noted after 20 ft, no loss of balance. Up with wedge shoe for WB restrictions     Balance  Posture: Good  Sitting - Static: Good  Sitting - Dynamic: Good  Standing - Static: Good  Standing - Dynamic: Fair;+        Plan   Plan  Times per week: 5x/week  Current Treatment Recommendations: Transfer Training, Endurance Training, Gait Training, Stair training, Functional Mobility Training, Balance Training  Safety Devices  Type of devices: Call light within reach, Left in chair  Restraints  Initially in place: No    Pt up to side of bed x 15 minutes, good balance, educated on importance of assist with mobility, continuing AROM in bed. /88 After ambulation               AM-PAC Score  AM-PAC Inpatient Mobility Raw Score : 22 (10/15/21 1106)  AM-PAC Inpatient T-Scale Score : 53.28 (10/15/21 1106)  Mobility Inpatient CMS 0-100% Score: 20.91 (10/15/21 1106)  Mobility Inpatient CMS G-Code Modifier : CJ (10/15/21 1106)          Goals  Short term goals  Time Frame for Short term goals: 4 visits  Short term goal 1: Pt indep with all transfers  Short term goal 2: Pt ambulate 200 ft with rw and wedge shoe, supervision  Short term goal 3: Pt negotiate 12 stairs with 2 rails and SBA  Patient Goals   Patient goals :  To go home       Therapy Time   Individual Concurrent Group Co-treatment   Time In 0920         Time Out 0945         Minutes 25         Timed Code Treatment Minutes: [address] [facility], PT

## 2021-10-18 ENCOUNTER — CARE COORDINATION (OUTPATIENT)
Dept: CASE MANAGEMENT | Age: 56
End: 2021-10-18

## 2021-10-18 NOTE — CARE COORDINATION
[address] Transitions Initial Follow Up Call    Call within 2 business days of discharge: Yes    Patient: Codi Connell Patient : 1965   MRN: [id number]  Reason for Admission: Hypertensive emergency  Discharge Date: 10/15/21 RARS: Readmission Risk Score: 15      Last Discharge [facility]       Complaint Diagnosis Description Type Department Provider    10/12/21 Altered Mental Status Altered mental status, unspecified altered mental status type . .. ED to Hosp-Admission (Discharged) (ADMITTED) [facility] 4A Teofilo Foster MD; Joel Patel. .. Spoke with: Dulce Porter, she states BP is doing good, she has BP cuff at home been taking. Eating is difficult d/t tongue swelling but swelling is improving she is staying hydrated. Medications reviewed and taking all as directed. At her PCP at present time for f/u denies concerns. Facility: [facility]    Non-face-to-face services provided:  Obtained and reviewed discharge summary and/or continuity of care documents  Transitions of Care Initial Call    Was this an external facility discharge? No     Challenges to be reviewed by the provider   Additional needs identified to be addressed with provider: No  none             Method of communication with provider : none      Advance Care Planning:   Does patient have an Advance Directive: reviewed and current, reviewed and needs to be updated, not on file; education provided, patient declined education, decision maker updated and referral to internal ACP facilitator. Was this a readmission? No  Patient stated reason for admission: Hypertensive emergency  Patients top risk factors for readmission: functional physical ability, falls, lack of knowledge about disease and medication management    Care Transition Nurse (CTN) contacted the patient by telephone to perform post hospital discharge assessment. Verified name and  with patient as identifiers.  Provided introduction to self, and explanation of the CTN role.     CTN reviewed discharge instructions, medical action plan and red flags with patient who verbalized understanding. Patient given an opportunity to ask questions and does not have any further questions or concerns at this time. Were discharge instructions available to patient? Yes. Reviewed appropriate site of care based on symptoms and resources available to patient including: PCP and Specialist. The patient agrees to contact the PCP office for questions related to their healthcare. Medication reconciliation was performed with patient, who verbalizes understanding of administration of home medications. Advised obtaining a 90-day supply of all daily and as-needed medications. Covid Risk Education     Educated patient about risk for severe COVID-19 due to risk factors according to CDC guidelines. LPN CC reviewed discharge instructions, medical action plan and red flag symptoms with the patient who verbalized understanding. Discussed COVID vaccination status: Yes. Education provided on COVID-19 vaccination as appropriate. Discussed exposure protocols and quarantine with CDC Guidelines. Patient was given an opportunity to verbalize any questions and concerns and agrees to contact LPN CC or health care provider for questions related to their healthcare. Reviewed and educated patient on any new and changed medications related to discharge diagnosis. Was patient discharged with a pulse oximeter? No Discussed and confirmed pulse oximeter discharge instructions and when to notify provider or seek emergency care. LPN CC provided contact information. No further follow-up call indicated based on severity of symptoms and risk factors.       Care Transitions 24 Hour Call    Do you have any ongoing symptoms?: No  Do you have a copy of your discharge instructions?: Yes  Do you have all of your prescriptions and are they filled?: Yes  Have you been contacted by a [address]?: No  Have you scheduled

## 2021-10-20 NOTE — PROGRESS NOTES
Physician Progress Note      PATIENT:               Onelia Plasencia  CSN #:                  [id number]  :                       1965  ADMIT DATE:       10/12/2021 7:39 PM  [address] [place] DATE:        10/15/2021 12:18 PM  RESPONDING  PROVIDER #:        Shy Liao MD          QUERY TEXT:    Pt admitted with  HTN emergency with AMS . Pt noted to have HTN Encephalopathy   in D/C summary and also with possible PRES, both in Neuro consult note as   possible differential diagnosis's. If possible, please document in the   progress notes and discharge summary if you are evaluating and / or treating   any of the following: The medical record reflects the following:  Risk Factors: PRES, HTN emergency, AMS, new onset seizures  Clinical Indicators : CT head showed ill-defined area of vague hypodensity in   the left occipital lobe. Neuro consult possible PRES, HTN encephalopathy. Neuro PN 10/14 states patient appears to have HTN encephalopathy. Treatment: Ct Head, Seizure precautions, labs, monitor, Neuro consult  Options provided:  -- Altered mental status due to Posterior reversible encephalopathy syndrome  -- Alerted mental status due to Hypertensive Encephalopathy  -- Altered mental status due to combination of both Posterior reversible   encephalopathy syndrome and Hypertensive Encephalopathy  -- Other - I will add my own diagnosis  -- Disagree - Not applicable / Not valid  -- Disagree - Clinically unable to determine / Unknown  -- Refer to Clinical Documentation Reviewer    PROVIDER RESPONSE TEXT:    This patient has AMS due to HTN Encephalopathy. Query created by:  Judy Mandujano on 10/20/2021 6:34 AM      Electronically signed by:  Shy Liao MD 10/20/2021 6:43 AM

## 2021-11-15 ENCOUNTER — HOSPITAL ENCOUNTER (OUTPATIENT)
Dept: MRI IMAGING | Age: 56
Discharge: HOME | End: 2021-11-17
Payer: MEDICARE

## 2021-11-15 PROCEDURE — 6360000004 HC RX CONTRAST MEDICATION: Performed by: INTERNAL MEDICINE

## 2021-11-15 PROCEDURE — A9579 GAD-BASE MR CONTRAST NOS,1ML: HCPCS | Performed by: INTERNAL MEDICINE

## 2021-11-15 PROCEDURE — 70553 MRI BRAIN STEM W/O & W/DYE: CPT

## 2021-11-15 RX ADMIN — GADOTERIDOL 15 ML: 279.3 INJECTION, SOLUTION INTRAVENOUS at 15:21

## 2021-11-17 ENCOUNTER — ANESTHESIA EVENT (OUTPATIENT)
Dept: OPERATING ROOM | Age: 56
End: 2021-11-17
Payer: MEDICARE

## 2021-11-19 ENCOUNTER — APPOINTMENT (OUTPATIENT)
Dept: GENERAL RADIOLOGY | Age: 56
End: 2021-11-19
Attending: PODIATRIST
Payer: MEDICARE

## 2021-11-19 ENCOUNTER — ANESTHESIA (OUTPATIENT)
Dept: OPERATING ROOM | Age: 56
End: 2021-11-19
Payer: MEDICARE

## 2021-11-19 ENCOUNTER — HOSPITAL ENCOUNTER (OUTPATIENT)
Age: 56
Setting detail: OUTPATIENT SURGERY
Discharge: HOME HEALTH | End: 2021-11-19
Attending: PODIATRIST | Admitting: PODIATRIST
Payer: MEDICARE

## 2021-11-19 VITALS
HEIGHT: 63 IN | RESPIRATION RATE: 26 BRPM | SYSTOLIC BLOOD PRESSURE: 109 MMHG | DIASTOLIC BLOOD PRESSURE: 69 MMHG | HEART RATE: 91 BPM | OXYGEN SATURATION: 94 % | TEMPERATURE: 97.9 F | WEIGHT: 232 LBS | BODY MASS INDEX: 41.11 KG/M2

## 2021-11-19 VITALS — SYSTOLIC BLOOD PRESSURE: 138 MMHG | OXYGEN SATURATION: 99 % | DIASTOLIC BLOOD PRESSURE: 89 MMHG | TEMPERATURE: 76.3 F

## 2021-11-19 DIAGNOSIS — G89.18 ACUTE POSTOPERATIVE PAIN OF EXTREMITY: Primary | ICD-10-CM

## 2021-11-19 PROCEDURE — 2500000003 HC RX 250 WO HCPCS: Performed by: PODIATRIST

## 2021-11-19 PROCEDURE — 3600000013 HC SURGERY LEVEL 3 ADDTL 15MIN: Performed by: PODIATRIST

## 2021-11-19 PROCEDURE — 7100000010 HC PHASE II RECOVERY - FIRST 15 MIN: Performed by: PODIATRIST

## 2021-11-19 PROCEDURE — 3600000003 HC SURGERY LEVEL 3 BASE: Performed by: PODIATRIST

## 2021-11-19 PROCEDURE — 6370000000 HC RX 637 (ALT 250 FOR IP): Performed by: ANESTHESIOLOGY

## 2021-11-19 PROCEDURE — 3700000000 HC ANESTHESIA ATTENDED CARE: Performed by: PODIATRIST

## 2021-11-19 PROCEDURE — 2500000003 HC RX 250 WO HCPCS: Performed by: NURSE ANESTHETIST, CERTIFIED REGISTERED

## 2021-11-19 PROCEDURE — 2709999900 HC NON-CHARGEABLE SUPPLY: Performed by: PODIATRIST

## 2021-11-19 PROCEDURE — 6360000002 HC RX W HCPCS: Performed by: NURSE ANESTHETIST, CERTIFIED REGISTERED

## 2021-11-19 PROCEDURE — 3700000001 HC ADD 15 MINUTES (ANESTHESIA): Performed by: PODIATRIST

## 2021-11-19 PROCEDURE — 2580000003 HC RX 258: Performed by: ANESTHESIOLOGY

## 2021-11-19 PROCEDURE — 7100000001 HC PACU RECOVERY - ADDTL 15 MIN: Performed by: PODIATRIST

## 2021-11-19 PROCEDURE — 6360000002 HC RX W HCPCS: Performed by: PODIATRIST

## 2021-11-19 PROCEDURE — 7100000000 HC PACU RECOVERY - FIRST 15 MIN: Performed by: PODIATRIST

## 2021-11-19 PROCEDURE — C1713 ANCHOR/SCREW BN/BN,TIS/BN: HCPCS | Performed by: PODIATRIST

## 2021-11-19 PROCEDURE — 2720000010 HC SURG SUPPLY STERILE: Performed by: PODIATRIST

## 2021-11-19 PROCEDURE — 3209999900 FLUORO FOR SURGICAL PROCEDURES

## 2021-11-19 PROCEDURE — 7100000011 HC PHASE II RECOVERY - ADDTL 15 MIN: Performed by: PODIATRIST

## 2021-11-19 DEVICE — IMPLANTABLE DEVICE: Type: IMPLANTABLE DEVICE | Site: FOOT | Status: FUNCTIONAL

## 2021-11-19 DEVICE — K WIRE FIX L6IN DIA1.6MM FEM TIB SMOOTH BAYNT TOT FT FOR: Type: IMPLANTABLE DEVICE | Site: FOOT | Status: FUNCTIONAL

## 2021-11-19 RX ORDER — CLINDAMYCIN PHOSPHATE 600 MG/50ML
600 INJECTION INTRAVENOUS ONCE
Status: COMPLETED | OUTPATIENT
Start: 2021-11-19 | End: 2021-11-19

## 2021-11-19 RX ORDER — GLYCOPYRROLATE 1 MG/5 ML
SYRINGE (ML) INTRAVENOUS PRN
Status: DISCONTINUED | OUTPATIENT
Start: 2021-11-19 | End: 2021-11-19 | Stop reason: SDUPTHER

## 2021-11-19 RX ORDER — ONDANSETRON 2 MG/ML
INJECTION INTRAMUSCULAR; INTRAVENOUS PRN
Status: DISCONTINUED | OUTPATIENT
Start: 2021-11-19 | End: 2021-11-19 | Stop reason: SDUPTHER

## 2021-11-19 RX ORDER — DEXAMETHASONE SODIUM PHOSPHATE 10 MG/ML
INJECTION INTRAMUSCULAR; INTRAVENOUS PRN
Status: DISCONTINUED | OUTPATIENT
Start: 2021-11-19 | End: 2021-11-19 | Stop reason: SDUPTHER

## 2021-11-19 RX ORDER — HYDROCODONE BITARTRATE AND ACETAMINOPHEN 5; 325 MG/1; MG/1
2 TABLET ORAL PRN
Status: COMPLETED | OUTPATIENT
Start: 2021-11-19 | End: 2021-11-19

## 2021-11-19 RX ORDER — FENTANYL CITRATE 50 UG/ML
25 INJECTION, SOLUTION INTRAMUSCULAR; INTRAVENOUS EVERY 5 MIN PRN
Status: DISCONTINUED | OUTPATIENT
Start: 2021-11-19 | End: 2021-11-19 | Stop reason: HOSPADM

## 2021-11-19 RX ORDER — SODIUM CHLORIDE, SODIUM LACTATE, POTASSIUM CHLORIDE, CALCIUM CHLORIDE 600; 310; 30; 20 MG/100ML; MG/100ML; MG/100ML; MG/100ML
INJECTION, SOLUTION INTRAVENOUS CONTINUOUS
Status: DISCONTINUED | OUTPATIENT
Start: 2021-11-19 | End: 2021-11-19 | Stop reason: HOSPADM

## 2021-11-19 RX ORDER — PHENYLEPHRINE HCL IN 0.9% NACL 1 MG/10 ML
SYRINGE (ML) INTRAVENOUS PRN
Status: DISCONTINUED | OUTPATIENT
Start: 2021-11-19 | End: 2021-11-19 | Stop reason: SDUPTHER

## 2021-11-19 RX ORDER — OXYCODONE HYDROCHLORIDE AND ACETAMINOPHEN 5; 325 MG/1; MG/1
1 TABLET ORAL EVERY 6 HOURS PRN
Qty: 28 TABLET | Refills: 0 | Status: SHIPPED | OUTPATIENT
Start: 2021-11-19 | End: 2021-11-26

## 2021-11-19 RX ORDER — LIDOCAINE HYDROCHLORIDE 20 MG/ML
INJECTION, SOLUTION EPIDURAL; INFILTRATION; INTRACAUDAL; PERINEURAL PRN
Status: DISCONTINUED | OUTPATIENT
Start: 2021-11-19 | End: 2021-11-19 | Stop reason: SDUPTHER

## 2021-11-19 RX ORDER — FENTANYL CITRATE 50 UG/ML
50 INJECTION, SOLUTION INTRAMUSCULAR; INTRAVENOUS EVERY 5 MIN PRN
Status: DISCONTINUED | OUTPATIENT
Start: 2021-11-19 | End: 2021-11-19 | Stop reason: HOSPADM

## 2021-11-19 RX ORDER — SODIUM CHLORIDE 9 MG/ML
25 INJECTION, SOLUTION INTRAVENOUS PRN
Status: DISCONTINUED | OUTPATIENT
Start: 2021-11-19 | End: 2021-11-19 | Stop reason: HOSPADM

## 2021-11-19 RX ORDER — EPHEDRINE SULFATE/0.9% NACL/PF 50 MG/5 ML
SYRINGE (ML) INTRAVENOUS PRN
Status: DISCONTINUED | OUTPATIENT
Start: 2021-11-19 | End: 2021-11-19 | Stop reason: SDUPTHER

## 2021-11-19 RX ORDER — PROMETHAZINE HYDROCHLORIDE 25 MG/ML
6.25 INJECTION, SOLUTION INTRAMUSCULAR; INTRAVENOUS
Status: DISCONTINUED | OUTPATIENT
Start: 2021-11-19 | End: 2021-11-19 | Stop reason: HOSPADM

## 2021-11-19 RX ORDER — LIDOCAINE HYDROCHLORIDE 10 MG/ML
1 INJECTION, SOLUTION EPIDURAL; INFILTRATION; INTRACAUDAL; PERINEURAL
Status: DISCONTINUED | OUTPATIENT
Start: 2021-11-19 | End: 2021-11-19 | Stop reason: HOSPADM

## 2021-11-19 RX ORDER — SODIUM CHLORIDE 0.9 % (FLUSH) 0.9 %
10 SYRINGE (ML) INJECTION PRN
Status: DISCONTINUED | OUTPATIENT
Start: 2021-11-19 | End: 2021-11-19 | Stop reason: HOSPADM

## 2021-11-19 RX ORDER — HYDROCODONE BITARTRATE AND ACETAMINOPHEN 5; 325 MG/1; MG/1
1 TABLET ORAL PRN
Status: COMPLETED | OUTPATIENT
Start: 2021-11-19 | End: 2021-11-19

## 2021-11-19 RX ORDER — PROPOFOL 10 MG/ML
INJECTION, EMULSION INTRAVENOUS PRN
Status: DISCONTINUED | OUTPATIENT
Start: 2021-11-19 | End: 2021-11-19 | Stop reason: SDUPTHER

## 2021-11-19 RX ORDER — ONDANSETRON 2 MG/ML
4 INJECTION INTRAMUSCULAR; INTRAVENOUS
Status: DISCONTINUED | OUTPATIENT
Start: 2021-11-19 | End: 2021-11-19 | Stop reason: HOSPADM

## 2021-11-19 RX ORDER — FENTANYL CITRATE 50 UG/ML
INJECTION, SOLUTION INTRAMUSCULAR; INTRAVENOUS PRN
Status: DISCONTINUED | OUTPATIENT
Start: 2021-11-19 | End: 2021-11-19 | Stop reason: SDUPTHER

## 2021-11-19 RX ORDER — MIDAZOLAM HYDROCHLORIDE 1 MG/ML
INJECTION INTRAMUSCULAR; INTRAVENOUS PRN
Status: DISCONTINUED | OUTPATIENT
Start: 2021-11-19 | End: 2021-11-19 | Stop reason: SDUPTHER

## 2021-11-19 RX ORDER — SODIUM CHLORIDE 0.9 % (FLUSH) 0.9 %
10 SYRINGE (ML) INJECTION EVERY 12 HOURS SCHEDULED
Status: DISCONTINUED | OUTPATIENT
Start: 2021-11-19 | End: 2021-11-19 | Stop reason: HOSPADM

## 2021-11-19 RX ORDER — SODIUM CHLORIDE 9 MG/ML
INJECTION, SOLUTION INTRAVENOUS CONTINUOUS
Status: DISCONTINUED | OUTPATIENT
Start: 2021-11-19 | End: 2021-11-19

## 2021-11-19 RX ADMIN — Medication 50 MCG: at 15:19

## 2021-11-19 RX ADMIN — HYDROCODONE BITARTRATE AND ACETAMINOPHEN 2 TABLET: 5; 325 TABLET ORAL at 17:49

## 2021-11-19 RX ADMIN — Medication 50 MCG: at 16:25

## 2021-11-19 RX ADMIN — SODIUM CHLORIDE, POTASSIUM CHLORIDE, SODIUM LACTATE AND CALCIUM CHLORIDE: 600; 310; 30; 20 INJECTION, SOLUTION INTRAVENOUS at 16:15

## 2021-11-19 RX ADMIN — SODIUM CHLORIDE, POTASSIUM CHLORIDE, SODIUM LACTATE AND CALCIUM CHLORIDE: 600; 310; 30; 20 INJECTION, SOLUTION INTRAVENOUS at 15:26

## 2021-11-19 RX ADMIN — Medication 10 MG: at 15:43

## 2021-11-19 RX ADMIN — Medication 20 MG: at 15:45

## 2021-11-19 RX ADMIN — Medication 100 MCG: at 15:25

## 2021-11-19 RX ADMIN — MIDAZOLAM 2 MG: 1 INJECTION INTRAMUSCULAR; INTRAVENOUS at 15:14

## 2021-11-19 RX ADMIN — Medication 20 MG: at 16:03

## 2021-11-19 RX ADMIN — Medication 0.2 MG: at 15:48

## 2021-11-19 RX ADMIN — DEXAMETHASONE SODIUM PHOSPHATE 10 MG: 10 INJECTION INTRAMUSCULAR; INTRAVENOUS at 15:27

## 2021-11-19 RX ADMIN — ONDANSETRON 4 MG: 2 INJECTION, SOLUTION INTRAMUSCULAR; INTRAVENOUS at 15:37

## 2021-11-19 RX ADMIN — CLINDAMYCIN PHOSPHATE 600 MG: 12 INJECTION, SOLUTION INTRAVENOUS at 15:25

## 2021-11-19 RX ADMIN — PROPOFOL 200 MG: 10 INJECTION, EMULSION INTRAVENOUS at 15:19

## 2021-11-19 RX ADMIN — LIDOCAINE HYDROCHLORIDE 100 MG: 20 INJECTION, SOLUTION EPIDURAL; INFILTRATION; INTRACAUDAL; PERINEURAL at 15:19

## 2021-11-19 RX ADMIN — Medication 200 MCG: at 15:38

## 2021-11-19 RX ADMIN — Medication 200 MCG: at 15:58

## 2021-11-19 RX ADMIN — Medication 100 MCG: at 15:29

## 2021-11-19 RX ADMIN — Medication 200 MCG: at 16:10

## 2021-11-19 ASSESSMENT — PULMONARY FUNCTION TESTS
PIF_VALUE: 25
PIF_VALUE: 22
PIF_VALUE: 2
PIF_VALUE: 25
PIF_VALUE: 12
PIF_VALUE: 25
PIF_VALUE: 22
PIF_VALUE: 25
PIF_VALUE: 22
PIF_VALUE: 12
PIF_VALUE: 22
PIF_VALUE: 24
PIF_VALUE: 24
PIF_VALUE: 26
PIF_VALUE: 25
PIF_VALUE: 13
PIF_VALUE: 25
PIF_VALUE: 12
PIF_VALUE: 25
PIF_VALUE: 13
PIF_VALUE: 25
PIF_VALUE: 26
PIF_VALUE: 12
PIF_VALUE: 0
PIF_VALUE: 25
PIF_VALUE: 25
PIF_VALUE: 2
PIF_VALUE: 12
PIF_VALUE: 23
PIF_VALUE: 25
PIF_VALUE: 12
PIF_VALUE: 22
PIF_VALUE: 1
PIF_VALUE: 25
PIF_VALUE: 12
PIF_VALUE: 25
PIF_VALUE: 22
PIF_VALUE: 12
PIF_VALUE: 25
PIF_VALUE: 23
PIF_VALUE: 26
PIF_VALUE: 25
PIF_VALUE: 24
PIF_VALUE: 25
PIF_VALUE: 12
PIF_VALUE: 0
PIF_VALUE: 0
PIF_VALUE: 23
PIF_VALUE: 22
PIF_VALUE: 25
PIF_VALUE: 12
PIF_VALUE: 23
PIF_VALUE: 24
PIF_VALUE: 25
PIF_VALUE: 25
PIF_VALUE: 12
PIF_VALUE: 25
PIF_VALUE: 25
PIF_VALUE: 22
PIF_VALUE: 12
PIF_VALUE: 26
PIF_VALUE: 12
PIF_VALUE: 2
PIF_VALUE: 25
PIF_VALUE: 22
PIF_VALUE: 0
PIF_VALUE: 25
PIF_VALUE: 26
PIF_VALUE: 12
PIF_VALUE: 25
PIF_VALUE: 12
PIF_VALUE: 1
PIF_VALUE: 1
PIF_VALUE: 12
PIF_VALUE: 25
PIF_VALUE: 10
PIF_VALUE: 26
PIF_VALUE: 25
PIF_VALUE: 24
PIF_VALUE: 25
PIF_VALUE: 26
PIF_VALUE: 12
PIF_VALUE: 26
PIF_VALUE: 24
PIF_VALUE: 12

## 2021-11-19 ASSESSMENT — PAIN DESCRIPTION - DESCRIPTORS
DESCRIPTORS: ACHING
DESCRIPTORS: ACHING;CONSTANT

## 2021-11-19 ASSESSMENT — PAIN DESCRIPTION - ORIENTATION: ORIENTATION: RIGHT

## 2021-11-19 ASSESSMENT — PAIN - FUNCTIONAL ASSESSMENT: PAIN_FUNCTIONAL_ASSESSMENT: 0-10

## 2021-11-19 ASSESSMENT — ENCOUNTER SYMPTOMS: SHORTNESS OF BREATH: 0

## 2021-11-19 ASSESSMENT — PAIN SCALES - GENERAL
PAINLEVEL_OUTOF10: 7
PAINLEVEL_OUTOF10: 5
PAINLEVEL_OUTOF10: 7
PAINLEVEL_OUTOF10: 0
PAINLEVEL_OUTOF10: 0
PAINLEVEL_OUTOF10: 7

## 2021-11-19 ASSESSMENT — PAIN DESCRIPTION - LOCATION: LOCATION: FOOT

## 2021-11-19 NOTE — ANESTHESIA PRE PROCEDURE
Department of Anesthesiology  Preprocedure Note       Name:  Daniel Solorzano   Age:  55 y.o.  :  1965                                          MRN:  [id number]         Date:  2021      Surgeon: En Lizarraga):  Sabina Parham DPM    Procedure: Procedure(s):  RIGHT FOOT  REVISION   BUNIONECTOMY WITH OPEN TREATMENT METATARSALS AND HARDWARE REMOVAL- WCB          2.7 MINI FRAG SET FROM MARCY       3.0 CANNULATED SCREW SET FROM MARCY       JUDY    Medications prior to admission:   Prior to Admission medications    Medication Sig Start Date End Date Taking?  Authorizing Provider   albuterol sulfate HFA (PROVENTIL HFA) 108 (90 Base) MCG/ACT inhaler Inhale 2 puffs into the lungs every 6 hours as needed for Wheezing 10/15/21   Yaya Estevez MD   hydrALAZINE (APRESOLINE) 50 MG tablet Take 1 tablet by mouth every 12 hours 10/15/21   Yaya Estevez MD   lisinopril-hydroCHLOROthiazide (PRINZIDE;ZESTORETIC) 20-25 MG per tablet Take 1 tablet by mouth daily    Historical Provider, MD   propranolol (INDERAL) 10 MG tablet Take 10 mg by mouth 2 times daily    Historical Provider, MD   lamoTRIgine (LAMICTAL) 150 MG tablet Take 300 mg by mouth daily    Historical Provider, MD   allopurinol (ZYLOPRIM) 300 MG tablet Take 300 mg by mouth daily    Historical Provider, MD   escitalopram (LEXAPRO) 20 MG tablet Take 20 mg by mouth daily    Historical Provider, MD   DULoxetine (CYMBALTA) 60 MG extended release capsule Take 120 mg by mouth every evening    Historical Provider, MD   benztropine (COGENTIN) 0.5 MG tablet Take 0.5 mg by mouth 2 times daily    Historical Provider, MD   loratadine (CLARITIN) 10 MG tablet Take 10 mg by mouth daily    Historical Provider, MD   traZODone (DESYREL) 100 MG tablet Take 100 mg by mouth nightly    Historical Provider, MD   beclomethasone (QVAR) 80 MCG/ACT inhaler Inhale 1 puff into the lungs 2 times daily    Historical Provider, MD       Current medications:    Current Facility-Administered Medications Medication Dose Route Frequency Provider Last Rate Last Admin    0.9 % sodium chloride infusion   IntraVENous Continuous Blake W Kaushik, DO        lactated ringers infusion   IntraVENous Continuous Blakechristiano Faulkner, DO        sodium chloride flush 0.9 % injection 10 mL  10 mL IntraVENous 2 times per day Blake W Kaushik, DO        sodium chloride flush 0.9 % injection 10 mL  10 mL IntraVENous PRN Glory Torrie, DO        0.9 % sodium chloride infusion  25 mL IntraVENous PRN Glory Torrie, DO        lidocaine PF 1 % injection 1 mL  1 mL IntraDERmal Once PRN Glory Torrie, DO           Allergies: Allergies   Allergen Reactions    Bactrim Anaphylaxis    Demerol Anaphylaxis    Lyrica [Pregabalin] Other (See Comments)     Suicidal thoughts    Pcn [Penicillins] Other (See Comments)     Reaction as child       Problem List:    Patient Active Problem List   Diagnosis Code    Asthma J45.909    Fibromyalgia M79.7    Depression F32. A    Borderline diabetes R73.03    Herpes simplex virus (HSV) infection B00.9    Depression F32. A    History of lumbosacral spine surgery Z98.890    Sleep apnea G47.30    [facility] 12[facility] Z90.710, Z90.722, Z90.79    Hypertensive urgency I16.0    Essential hypertension I10    Seizure-like activity (HCC) R56.9    Hypokalemia E87.6    Altered mental status R41.82       Past Medical History:        Diagnosis Date    Arthritis     Asthma     Borderline diabetes     Depression     2010    Fibromyalgia     Herpes simplex without mention of complication     Hypertensive urgency 10/12/2021    Seiz[facility] ([facility] [address]     Sleep apnea 12/13/2011       Past Surgical History:        Procedure Laterality Date    ABDOMINAL ADHESION SURGERY  12/29/11    BACK SURGERY  Cage and Screws    x's 3    BACK SURGERY  2/24/12    CHOLECYSTECTOMY  1990    COLONOSCOPY      [facility]    ENTEROCELE REPAIR  12/29/11    FOOT TENDON SURGERY      x's 2, Right Foot    HYSTERECTOMY  12/29/11    [facility]    [facility]    PAULETTE [facility]SO  12/29/11    [facility]    TONSILLECTOMY      as child    TUBAL LIGATION  1992       Social History:    Social History     Tobacco Use    Smoking status: Never Smoker    Smokeless tobacco: Never Used   Substance Use Topics    Alcohol use: Yes     Comment: Rare                                Counseling given: Not Answered      Vital Signs (Current):   Vitals:    11/19/21 1312 11/19/21 1320   BP: 127/76    Pulse: 72    Resp: 18    Temp: 97.5 °F (36.4 °C)    TempSrc: Temporal    SpO2: 95%    Weight:  232 lb (105.2 kg)   Height:  5' 3\" (1.6 m)                                              BP Readings from Last 3 Encounters:   11/19/21 127/76   10/15/21 (!) 149/109   06/26/20 (!) 140/86       NPO Status:                                                                                 BMI:   Wt Readings from Last 3 Encounters:   11/19/21 232 lb (105.2 kg)   05/16/17 250 lb (113.4 kg)   02/28/12 218 lb (98.9 kg)     Body mass index is 41.1 kg/m². CBC:   Lab Results   Component Value Date    WBC 5.6 10/15/2021    RBC 4.31 10/15/2021    RBC 3.71 01/09/2012    HGB 12.0 10/15/2021    HCT 39.1 10/15/2021    MCV 90.7 10/15/2021    RDW 13.8 10/15/2021     10/15/2021     01/09/2012       CMP:   Lab Results   Component Value Date     10/15/2021    K 3.6 10/15/2021     10/15/2021    CO2 26 10/15/2021    BUN 19 10/15/2021    CREATININE 0.89 10/15/2021    GFRAA >60 10/15/2021    LABGLOM >60 10/15/2021    GLUCOSE 119 10/15/2021    GLUCOSE 95 12/22/2011    PROT 6.8 10/13/2021    CALCIUM 9.3 10/15/2021    BILITOT 0.49 10/13/2021    ALKPHOS 68 10/13/2021    AST 19 10/13/2021    ALT 31 10/13/2021       POC Tests: No results for input(s): POCGLU, POCNA, POCK, POCCL, POCBUN, POCHEMO, POCHCT in the last 72 hours.     Coags:   Lab Results   Component Value Date    PROTIME 11.2 10/13/2021    PROTIME 10.1 10/26/2011    INR 1.1 10/13/2021    APTT 26.6 10/26/2011       HCG (If Applicable):   Lab Results   Component Value Date    PREGTESTUR NEGATIVE 11/12/2011    HCGQUANT <2 12/22/2011        ABGs: No results found for: PHART, PO2ART, HZO3SYO, YTJ2XOI, BEART, P9EBLKJH     Type & Screen (If Applicable):  No results found for: LABABO, LABRH    Drug/Infectious Status (If Applicable):  No results found for: HIV, HEPCAB    COVID-19 Screening (If Applicable):   Lab Results   Component Value Date    COVID19 Not Detected 10/13/2021           Anesthesia Evaluation    Airway: Mallampati: I  TM distance: >3 FB   Neck ROM: full  Mouth opening: > = 3 FB Dental:          Pulmonary:   (+) sleep apnea:  asthma:     (-) shortness of breath                           Cardiovascular:    (+) hypertension:,                   Neuro/Psych:   (+) seizures: well controlled,             GI/Hepatic/Renal:             Endo/Other:                     Abdominal:             Vascular:           Other Findings:             Anesthesia Plan      general     ASA 3                                 Bobby Huitron MD   11/19/2021

## 2021-11-19 NOTE — H&P
Interval H&P Note    Pt Name: Yana Beckham  MRN: [id number]  YOB: 1965  Date of evaluation: 11/19/2021      [x] I have reviewed the hard copy office note by Dr. Timothy Ayala dated 10/25/2021 labeled in short chart which meets the criteria for an Interval History and Physical note. [x] I have examined  Yana Beckham  There are no changes to the patient who is scheduled for a right foot revision bunionectomy with open treatment metatarsals and hardware removal by Dr. Aleyda Gonzalez for dislocation fractures metatarsals right foot and bunion 1st MTPJ. The patient denies new health changes, fever, chills, wheezing, cough, increased SOB, chest pain, open sores or wounds. Denies hx diabetes. Denies taking any blood thinning medications in the last 10 days. Patient was hospitalized in 10/2021 for hypertension and altered mental status. Patient required Nicardipine drip and was transitioned to oral medications for discharge. Patient was found to have new lesion on left occipital lobe during CT brain. Patient was unable to have MRI due to implanted pain pump. Patient was discharged and instructed to have MRI brain outpatient. Patient did have outpatient MRI brain completed on 11/15/2021. Vital signs: /76   Pulse 72   Temp 97.5 °F (36.4 °C) (Temporal)   Resp 18   Ht 5' 3\" (1.6 m)   Wt 232 lb (105.2 kg)   LMP 11/29/2011   SpO2 95%   BMI 41.10 kg/m²     Allergies:  Bactrim, Demerol, Lyrica [pregabalin], and Pcn [penicillins]    Medications:    Prior to Admission medications    Medication Sig Start Date End Date Taking?  Authorizing Provider   albuterol sulfate HFA (PROVENTIL HFA) 108 (90 Base) MCG/ACT inhaler Inhale 2 puffs into the lungs every 6 hours as needed for Wheezing 10/15/21   Taz Leon MD   hydrALAZINE (APRESOLINE) 50 MG tablet Take 1 tablet by mouth every 12 hours 10/15/21   Taz Leon MD   lisinopril-hydroCHLOROthiazide (PRINZIDE;ZESTORETIC) 20-25 MG per tablet Take 1 tablet by mouth daily    Historical Provider, MD   oxyCODONE (ROXICODONE) 5 MG immediate release tablet Take 5 mg by mouth every 4 hours as needed for Pain.     Historical Provider, MD   propranolol (INDERAL) 10 MG tablet Take 10 mg by mouth 2 times daily    Historical Provider, MD   lamoTRIgine (LAMICTAL) 150 MG tablet Take 300 mg by mouth daily    Historical Provider, MD   allopurinol (ZYLOPRIM) 300 MG tablet Take 300 mg by mouth daily    Historical Provider, MD   escitalopram (LEXAPRO) 20 MG tablet Take 20 mg by mouth daily    Historical Provider, MD   DULoxetine (CYMBALTA) 60 MG extended release capsule Take 120 mg by mouth every evening    Historical Provider, MD   benztropine (COGENTIN) 0.5 MG tablet Take 0.5 mg by mouth 2 times daily    Historical Provider, MD   loratadine (CLARITIN) 10 MG tablet Take 10 mg by mouth daily    Historical Provider, MD   traZODone (DESYREL) 100 MG tablet Take 100 mg by mouth nightly    Historical Provider, MD   beclomethasone (QVAR) 80 MCG/ACT inhaler Inhale 1 puff into the lungs 2 times daily    Historical Provider, MD         Past Medical History:     Past Medical History:   Diagnosis Date    Asthma     Borderline diabetes     Depression     2010    Fibromyalgia     Herpes simplex without mention of complication     Hypertensive urgency 10/12/2021    Sleep apnea 12/13/2011        Past Surgical History:     Past Surgical History:   Procedure Laterality Date    ABDOMINAL ADHESION SURGERY  12/29/11    BACK SURGERY  Cage and Screws    x's 3    BACK SURGERY  2/24/12    CHOLECYSTECTOMY  1990    [facility]    ENTEROCELE REPAIR  12/29/11    FOOT TENDON SURGERY      x's 2, Right Foot    HYSTERECTOMY  12/29/11    HYSTEROSCOPY  [facility]/14/[facility]    [facility]  [facility]    TONSILLECTOMY      as child    TUBAL LIGATION  1992       Social History:     Social History     Socioeconomic History    Marital status:      Spouse name: Not on file    Number of children: Not on file    Years of education: Not on file    Highest education level: Not on file   Occupational History    Not on file   Tobacco Use    Smoking status: Never Smoker    Smokeless tobacco: Never Used   Substance and Sexual Activity    Alcohol use: Yes     Comment: Rare    Drug use: No    Sexual activity: Not Currently     Partners: Male   Other Topics Concern    Not on file   Social History Narrative    Not on file     Social Determinants of Health     Financial Resource Strain:     Difficulty of Paying Living Expenses: Not on file   Food Insecurity:     Worried About Running Out of Food in the Last Year: Not on file    Chuckie of Food in the Last Year: Not on file   Transportation Needs:     Lack of Transportation (Medical): Not on file    Lack of Transportation (Non-Medical):  Not on file   Physical Activity:     Days of Exercise per Week: Not on file    Minutes of Exercise per Session: Not on file   Stress:     Feeling of Stress : Not on file   Social Connections:     Frequency of Communication with Friends and Family: Not on file    Frequency of Social Gatherings with Friends and Family: Not on file    Attends Taoism Services: Not on file    Active Member of [address] [place] [address] [employer] or Organizations: Not on file    Attends Club or Organization Meetings: Not on file    Marital Status: Not on file   Intimate Partner Violence:     Fear of Current or Ex-Partner: Not on file    Emotionally Abused: Not on file    Physically Abused: Not on file    Sexually Abused: Not on file   Housing Stability:     Unable to Pay for Housing in the Last Year: Not on file    Number of [place] in the Last Year: Not on file    Unstable Housing in the Last Year: Not on file       Family History:     Family History   Problem Relation Age of Onset    Depression Mother     ADHD Neg Hx     Alcohol Abuse Neg Hx     Allergic Rhinitis Neg Hx     Allergies Neg Hx     Allergy palpitations. GASTROINTESTINAL: negative for nausea, vomiting, diarrhea, constipation, change in bowel habits, abdominal pain   GENITOURINARY: negative for difficulty of urination, burning with urination, frequency   INTEGUMENT: Easy bruising negative for rash, skin lesions  HEMATOLOGIC/LYMPHATIC: Right foot swelling  ALLERGIC/IMMUNOLOGIC: Eczema chest  ENDOCRINE: negative increase in drinking, increase in urination, hot or cold intolerance  MUSCULOSKELETAL: Right foot pain. negative muscle aches, swelling of joints  NEUROLOGICAL: Headaches. negative for dizziness, lightheadedness, numbness, pain, tingling extremities  BEHAVIOR/PSYCH: Depression and anxiety    Physical Exam:     General Appearance:  alert, well appearing, and in no acute distress morbidly obese  Mental status: oriented to person, place, and time with normal affect  Head:  normocephalic, atraumatic. Eye: no icterus, redness, pupils equal and reactive, extraocular eye movements intact, conjunctiva clear  Ear: normal external ear, no discharge, hearing intact  Nose:  no drainage noted  Mouth: mucous membranes moist  Neck: supple, no carotid bruits, thyroid not palpable  Lungs: Bilateral equal air entry, clear to ausculation, no wheezing, rales or rhonchi, normal effort  Cardiovascular: HR 72 normal rate, regular rhythm, no murmur, gallop, rub. Abdomen: Soft, rounded, nontender, nondistended, normal bowel sounds  Neurologic: There are no new focal motor or sensory deficits, normal muscle tone and bulk, no abnormal sensation, normal speech, cranial nerves II through XII grossly intact  Skin: No gross lesions, rashes, bruising or bleeding on exposed skin area  Extremities:  peripheral pulses palpable, no pedal edema or calf pain with palpation  Psych: normal affect     Labs:  No results for input(s): HGB, HCT, WBC, MCV, PLT, NA, K, CL, CO2, BUN, CREATININE, GLUCOSE, INR, PROTIME, APTT, AST, ALT, LABALBU, HCG in the last 720 hours.     No results for input(s): COVID19 in the last 720 hours.     RALPH Riojas CNP  Electronically signed 11/19/2021 at 1:27 PM

## 2021-11-19 NOTE — BRIEF OP NOTE
PODIATRY BRIEF OP NOTE    PATIENT NAME: Colton De León  YOB: 1965  -  55 y.o. female  MRN: [id number]  DATE: 11/19/2021  BILLING #: [id number]    Surgeon(s):  Trista Benjamin DPM     ASSISTANTS: Maida Sands DPM PGY-2    PRE-OP DIAGNOSIS:   1. Fracture dislocation of 1st metatarsal, right foot. POST-OP DIAGNOSIS: Same as above. PROCEDURE:     1. ORIF 1st metatarsal, right foot  2. Removal of hardware, right foot. ANESTHESIA: General     HEMOSTASIS: Pneumatic Ankle tourniquet set to 250 mmHg    ESTIMATED BLOOD LOSS: Less than 20cc. MATERIALS:   Implant Name Type Inv. Item Serial No.  Lot No. LRB No. Used Action   K WIRE FIX L6IN DIA1. 6MM FEM TIB SMOOTH BAYNT TOT FT FOR  K WIRE FIX L6IN DIA1. 6MM FEM TIB SMOOTH BAYNT TOT FT FOR  MARCY BIOMET TRAUMA-WD  Right 1 Implanted   SCREW BNE FLACA 3X18 MM PART THRD ROSALIND NS  SCREW BNE FLACA 3X18 MM PART THRD ROSALIND NS  MARCY BIOMET TRAUMA-  Right 1 Implanted   SCREW BNE FLACA 3X20 MM PART THRD ROSALIND NS  SCREW BNE FLACA 3X20 MM PART THRD ROSALIND NS  MARCY BIOMET TRAUMA-  Right 1 Implanted       INJECTABLES:   Pre-op 10 cc 1:1 mix of 0.5% marcaine plain and 1% lidocaine plain    Post-op 9cc 0.5% Marcaine plain. And 1 cc of 4mg dexamethasone phosphate    SPECIMEN:   * No specimens in log *    COMPLICATIONS: Fracture dislocation of 1st metatarsal of the right foot. FINDINGS: Fracture of 1st metatarsal with lateral dislocation. See op note for further detail    The patient was counseled at length about the risks of joaquin Covid-19 during their perioperative period and any recovery window from their procedure. The patient was made aware that joaquin Covid-19  may worsen their prognosis for recovering from their procedure  and lend to a higher morbidity and/or mortality risk. All material risks, benefits, and reasonable alternatives including postponing the procedure were discussed.  The patient does wish to proceed with the procedure at this time.     Pascual Gitelman, DPM   Podiatric Medicine & Surgery   11/19/2021 at 5:08 PM

## 2021-11-20 NOTE — OP NOTE
PODIATRY OP NOTE     PATIENT NAME: Armen Seo  YOB: 1965  -  55 y.o. female  MRN: [id number]  DATE: 11/19/2021  BILLING #: [id number]     Surgeon(s):  Dalia Zaldivar DPM      ASSISTANTS: Amanda Roque DPM PGY-2     PRE-OP DIAGNOSIS:   1. Fracture dislocation of 1st metatarsal, right foot.     POST-OP DIAGNOSIS: Same as above.     PROCEDURE:      1. ORIF 1st metatarsal, right foot  2. Removal of hardware, right foot.        ANESTHESIA: General      HEMOSTASIS: Pneumatic Ankle tourniquet set to 250 mmHg     ESTIMATED BLOOD LOSS: Less than 20cc.     MATERIALS:   Implant Name Type Inv. Item Serial No.  Lot No. LRB No. Used Action   K WIRE FIX L6IN DIA1. 6MM FEM TIB SMOOTH BAYNT TOT FT FOR   K WIRE FIX L6IN DIA1. 6MM FEM TIB SMOOTH BAYNT TOT FT FOR   MARCY BIOMET TRAUMA-WD   Right 1 Implanted   SCREW BNE FLACA 3X18 MM PART THRD ROSALIND NS   SCREW BNE FLACA 3X18 MM PART THRD ROSALIND NS   MARCY BIOMET TRAUMA-WD   Right 1 Implanted   SCREW BNE FLACA 3X20 MM PART THRD ROSALIND NS   SCREW BNE FLACA 3X20 MM PART THRD ROSALIND NS   MACRY BIOMET TRAUMA-WD   Right 1 Implanted         INJECTABLES:   Pre-op 10 cc 1:1 mix of 0.5% marcaine plain and 1% lidocaine plain     Post-op 9cc 0.5% Marcaine plain. And 1 cc of 4mg dexamethasone phosphate     SPECIMEN:   * No specimens in log *     COMPLICATIONS: Fracture dislocation of 1st metatarsal of the right foot.     FINDINGS: Fracture of 1st metatarsal with lateral dislocation. See op note for further detail     The patient was counseled at length about the risks of joaquin Covid-19 during their perioperative period and any recovery window from their procedure.  The patient was made aware that joaquin Covid-19  may worsen their prognosis for recovering from their procedure  and lend to a higher morbidity and/or mortality risk.  All material risks, benefits, and reasonable alternatives including postponing the procedure were discussed.  The patient does wish to proceed with the procedure at this time. INDICATION FOR PROCEDURE: Patient has a recent history of an Jose bunionectomy performed to the right foot. Prior to fully healing from the [facility], patient presented to the emergency department where she was seen to be having significantly elevated blood pressure. Patient reports that she suffered a seizure during her hospital stay. It is believed that the patient kicked an object during her seizure which fractured the osteotomy site. After discussion; it was recommended that she be scheduled for surgery to repair the first metatarsal fracture. Risks benefits of the procedure were explained to the patient, patient wishes to proceed. Consent was obtained had a painful bunion deformity for some time. T     PROCEDURE IN DETAIL: The patient was transported from pre-op to the operating room and placed on the operating table in the supine position with a safety strap across the lap. A pneumatic ankle tourniquet was applied. After adequate sedation by the Anesthesia, a Perez block of 10cc of 1:1 mixture of 1% lidocaine plain and 0.5% Marcaine plain was injected. The foot was then prepped and draped in the usual aseptic fashion. The foot was then exsanguinated with an Esmarch bandage. The pneumatic ankle tourniquet was inflated to 250 mmHg. Attention was directed to the first metatarsophalangeal joint. A dorsal medial incision was created over the first metatarsophalangeal joint with a #15 blade in the same area as the previous incision. The incision was then deepened. The skin and subcutaneous tissue were then undermined off of the capsule medially. There was noted significant scar tissue surrounding the first metatarsophalangeal joint. A dorsal linear capsular incision was then created over the first metatarsophalangeal joint.  The periosteum and capsule were then reflected off of the first metatarsal.  Once adequate debridement of scar tissue was performed, the first metatarsal head and proximal phalanx were able to be identified. The cartilage of this joint appeared to be intact and in good health. At this time a joint sparing procedure was selected to repair the first metatarsal.    In order to allow rotation of the first metatarsal head, a modified nahid osteotomy was performed to allow for rotation correction of the first metatarsophalangeal joint. Once the cut was made, 2 guidewires were inserted into the first metatarsal head as well as diagrams used to secure the bony fragments. Next 2 screws were inserted into the first metatarsal as definitive fixation of the osteotomy. Layered closure was then obtained using combination of 2-0 Monocryl, 4 Monocryl and 4-0 nylon. A postoperative block was then obtained using 9 cc of 0.5% percent Marcaine mixed with 1 cc of 4 mg dexamethasone phosphate. Dressings consisted of adaptic, 4 x 4s, Kerlix and an ACE bandage and a posterior splint. . The pneumatic ankle tourniquet was released and immediate hyperemic flush was noted to all five digits of the right foot. The patient tolerated the above procedure and anesthesia well without complications. The patient was transported from the operating room to the PACU with vital signs stable and vascular status intact to the foot. The patient is to follow up with Dr. Niko Jain as scheduled.       Electronically signed by Monie Townsend DPM on 11/19/2021 at 8:25 PM

## 2024-04-02 NOTE — ANESTHESIA POSTPROCEDURE EVALUATION
Department of Anesthesiology  Postprocedure Note    Patient: Rosalio Grimes  MRN: [id number]  YOB: 1965  Date of evaluation: 11/19/2021  Time:  5:09 PM     Procedure Summary     Date: 11/19/21 Room / Location: [address] - INPATIENT    Anesthesia Start: 7935 Anesthesia Stop:     Procedure: RIGHT FOOT  REVISION   BUNIONECTOMY WITH OPEN TREATMENT METATARSALS AND HARDWARE REMOVAL- WCB          2.7 MINI FRAG SET FROM MARCY       3.0 CANNULATED SCREW SET FROM Alessio Jenkins (Right Foot) Diagnosis: (DX DISLOCATION FX METATARSALS RIGHT FOOT, BUNION 1ST MTPJ)    Surgeons: Alexi Krishnan DPM Responsible Provider: Teresa Dacosta MD    Anesthesia Type: general ASA Status: 3          Anesthesia Type: No value filed. Sandeep Phase I:      Sandeep Phase II:      Last vitals: Reviewed and per EMR flowsheets.        Anesthesia Post Evaluation    Complications: no
.

## 2024-04-25 ENCOUNTER — HOSPITAL ENCOUNTER (OUTPATIENT)
Dept: PREADMISSION TESTING | Age: 59
Discharge: HOME | End: 2024-04-25
Payer: MEDICARE

## 2024-04-25 VITALS
OXYGEN SATURATION: 95 % | WEIGHT: 245.1 LBS | BODY MASS INDEX: 43.43 KG/M2 | SYSTOLIC BLOOD PRESSURE: 148 MMHG | RESPIRATION RATE: 14 BRPM | HEART RATE: 73 BPM | DIASTOLIC BLOOD PRESSURE: 86 MMHG | HEIGHT: 63 IN | TEMPERATURE: 94 F

## 2024-04-25 LAB
ABO + RH BLD: NORMAL
ALBUMIN SERPL-MCNC: 4.4 G/DL (ref 3.5–5.2)
ALP SERPL-CCNC: 79 U/L (ref 35–104)
ALT SERPL-CCNC: 23 U/L (ref 5–33)
ANION GAP SERPL CALCULATED.3IONS-SCNC: 7 MMOL/L (ref 9–17)
ARM BAND NUMBER: NORMAL
AST SERPL-CCNC: 20 U/L
BASOPHILS # BLD: <0.03 K/UL (ref 0–0.2)
BASOPHILS NFR BLD: 0 % (ref 0–2)
BILIRUB SERPL-MCNC: 0.2 MG/DL (ref 0.3–1.2)
BILIRUB UR QL STRIP: NEGATIVE
BLOOD BANK SAMPLE EXPIRATION: NORMAL
BLOOD GROUP ANTIBODIES SERPL: NEGATIVE
BUN SERPL-MCNC: 17 MG/DL (ref 6–20)
BUN/CREAT SERPL: 19 (ref 9–20)
CALCIUM SERPL-MCNC: 8.7 MG/DL (ref 8.6–10.4)
CHLORIDE SERPL-SCNC: 101 MMOL/L (ref 98–107)
CLARITY UR: ABNORMAL
CO2 SERPL-SCNC: 28 MMOL/L (ref 20–31)
COLOR UR: YELLOW
CREAT SERPL-MCNC: 0.9 MG/DL (ref 0.5–0.9)
EOSINOPHIL # BLD: 0.25 K/UL (ref 0–0.44)
EOSINOPHILS RELATIVE PERCENT: 3 % (ref 1–4)
EPI CELLS #/AREA URNS HPF: NORMAL /HPF (ref 0–5)
ERYTHROCYTE [DISTWIDTH] IN BLOOD BY AUTOMATED COUNT: 14.5 % (ref 11.8–14.4)
ERYTHROCYTE [SEDIMENTATION RATE] IN BLOOD BY PHOTOMETRIC METHOD: 6 MM/HR (ref 0–30)
GFR SERPL CREATININE-BSD FRML MDRD: 75 ML/MIN/1.73M2
GLUCOSE SERPL-MCNC: 89 MG/DL (ref 70–99)
GLUCOSE UR STRIP-MCNC: NEGATIVE MG/DL
HCT VFR BLD AUTO: 37.6 % (ref 36.3–47.1)
HGB BLD-MCNC: 11.4 G/DL (ref 11.9–15.1)
HGB UR QL STRIP.AUTO: NEGATIVE
IMM GRANULOCYTES # BLD AUTO: 0.03 K/UL (ref 0–0.3)
IMM GRANULOCYTES NFR BLD: 0 %
KETONES UR STRIP-MCNC: ABNORMAL MG/DL
LEUKOCYTE ESTERASE UR QL STRIP: NEGATIVE
LYMPHOCYTES NFR BLD: 1.27 K/UL (ref 1.1–3.7)
LYMPHOCYTES RELATIVE PERCENT: 18 % (ref 24–43)
MCH RBC QN AUTO: 26.9 PG (ref 25.2–33.5)
MCHC RBC AUTO-ENTMCNC: 30.3 G/DL (ref 28.4–34.8)
MCV RBC AUTO: 88.7 FL (ref 82.6–102.9)
MONOCYTES NFR BLD: 0.38 K/UL (ref 0.1–1.2)
MONOCYTES NFR BLD: 5 % (ref 3–12)
NEUTROPHILS NFR BLD: 74 % (ref 36–65)
NEUTS SEG NFR BLD: 5.3 K/UL (ref 1.5–8.1)
NITRITE UR QL STRIP: NEGATIVE
NRBC BLD-RTO: 0 PER 100 WBC
PH UR STRIP: 7 [PH] (ref 5–8)
PLATELET # BLD AUTO: 182 K/UL (ref 138–453)
PMV BLD AUTO: 8.6 FL (ref 8.1–13.5)
POTASSIUM SERPL-SCNC: 4.2 MMOL/L (ref 3.7–5.3)
PROT SERPL-MCNC: 6.5 G/DL (ref 6.4–8.3)
PROT UR STRIP-MCNC: NEGATIVE MG/DL
RBC # BLD AUTO: 4.24 M/UL (ref 3.95–5.11)
RBC # BLD: ABNORMAL 10*6/UL
RBC #/AREA URNS HPF: NORMAL /HPF (ref 0–2)
SODIUM SERPL-SCNC: 136 MMOL/L (ref 135–144)
SP GR UR STRIP: 1.02 (ref 1–1.03)
UROBILINOGEN UR STRIP-ACNC: NORMAL EU/DL (ref 0–1)
WBC #/AREA URNS HPF: NORMAL /HPF (ref 0–5)
WBC OTHER # BLD: 7.3 K/UL (ref 3.5–11.3)

## 2024-04-25 PROCEDURE — 86900 BLOOD TYPING SEROLOGIC ABO: CPT

## 2024-04-25 PROCEDURE — 36415 COLL VENOUS BLD VENIPUNCTURE: CPT

## 2024-04-25 PROCEDURE — 85025 COMPLETE CBC W/AUTO DIFF WBC: CPT

## 2024-04-25 PROCEDURE — 85652 RBC SED RATE AUTOMATED: CPT

## 2024-04-25 PROCEDURE — 86850 RBC ANTIBODY SCREEN: CPT

## 2024-04-25 PROCEDURE — 93005 ELECTROCARDIOGRAM TRACING: CPT | Performed by: ORTHOPAEDIC SURGERY

## 2024-04-25 PROCEDURE — 83036 HEMOGLOBIN GLYCOSYLATED A1C: CPT

## 2024-04-25 PROCEDURE — 87641 MR-STAPH DNA AMP PROBE: CPT

## 2024-04-25 PROCEDURE — 81001 URINALYSIS AUTO W/SCOPE: CPT

## 2024-04-25 PROCEDURE — 86901 BLOOD TYPING SEROLOGIC RH(D): CPT

## 2024-04-25 PROCEDURE — 80053 COMPREHEN METABOLIC PANEL: CPT

## 2024-04-25 RX ORDER — SUMATRIPTAN 50 MG/1
50 TABLET, FILM COATED ORAL
COMMUNITY

## 2024-04-25 RX ORDER — ONDANSETRON HYDROCHLORIDE 8 MG/1
8 TABLET, FILM COATED ORAL 2 TIMES DAILY PRN
COMMUNITY

## 2024-04-25 RX ORDER — BACLOFEN 10 MG/1
10 TABLET ORAL 3 TIMES DAILY PRN
COMMUNITY

## 2024-04-25 RX ORDER — ATORVASTATIN CALCIUM 40 MG/1
40 TABLET, FILM COATED ORAL DAILY
COMMUNITY
Start: 2022-10-31

## 2024-04-25 RX ORDER — PROPRANOLOL HYDROCHLORIDE 40 MG/1
40 TABLET ORAL 2 TIMES DAILY
COMMUNITY
Start: 2024-02-26

## 2024-04-25 RX ORDER — HYDRALAZINE HYDROCHLORIDE 25 MG/1
25 TABLET, FILM COATED ORAL 3 TIMES DAILY
COMMUNITY

## 2024-04-25 RX ORDER — OXCARBAZEPINE 150 MG/1
150 TABLET, FILM COATED ORAL 2 TIMES DAILY
COMMUNITY
Start: 2024-04-17

## 2024-04-25 RX ORDER — PRAMIPEXOLE DIHYDROCHLORIDE 0.12 MG/1
0.12 TABLET ORAL NIGHTLY
COMMUNITY
Start: 2024-04-02

## 2024-04-25 RX ORDER — ACETAMINOPHEN 500 MG
1000 TABLET ORAL ONCE
OUTPATIENT
Start: 2024-05-15

## 2024-04-25 RX ORDER — FAMOTIDINE 20 MG/1
20 TABLET, FILM COATED ORAL NIGHTLY
COMMUNITY
Start: 2024-04-15

## 2024-04-25 RX ORDER — TRAZODONE HYDROCHLORIDE 150 MG/1
75 TABLET ORAL NIGHTLY
COMMUNITY
Start: 2024-04-15

## 2024-04-25 RX ORDER — NIFEDIPINE 30 MG
30 TABLET, EXTENDED RELEASE ORAL DAILY
COMMUNITY
Start: 2022-10-07

## 2024-04-25 RX ORDER — ESCITALOPRAM OXALATE 10 MG/1
10 TABLET ORAL DAILY
COMMUNITY
Start: 2024-04-18

## 2024-04-25 RX ORDER — MELOXICAM 7.5 MG/1
7.5 TABLET ORAL DAILY PRN
COMMUNITY
Start: 2024-03-18

## 2024-04-25 RX ORDER — TOPIRAMATE 50 MG/1
100 TABLET, FILM COATED ORAL 2 TIMES DAILY
COMMUNITY
Start: 2020-01-07

## 2024-04-25 RX ORDER — FEXOFENADINE HCL AND PSEUDOEPHEDRINE HCI 60; 120 MG/1; MG/1
1 TABLET, EXTENDED RELEASE ORAL 2 TIMES DAILY PRN
COMMUNITY

## 2024-04-25 ASSESSMENT — PROMIS GLOBAL HEALTH SCALE
HOW IS THE PROMIS V1.1 BEING ADMINISTERED?: PAPER
IN GENERAL, PLEASE RATE HOW WELL YOU CARRY OUT YOUR USUAL SOCIAL ACTIVITIES (INCLUDES ACTIVITIES AT HOME, AT WORK, AND IN YOUR COMMUNITY, AND RESPONSIBILITIES AS A PARENT, CHILD, SPOUSE, EMPLOYEE, FRIEND, ETC) [ON A SCALE OF 1 (POOR) TO 5 (EXCELLENT)]?: GOOD
IN THE PAST 7 DAYS, HOW WOULD YOU RATE YOUR FATIGUE ON AVERAGE [ON A SCALE FROM 1 (NONE) TO 5 (VERY SEVERE)]?: MODERATE
IN GENERAL, HOW WOULD YOU RATE YOUR MENTAL HEALTH, INCLUDING YOUR MOOD AND YOUR ABILITY TO THINK [ON A SCALE OF 1 (POOR) TO 5 (EXCELLENT)]?: GOOD
IN THE PAST 7 DAYS, HOW OFTEN HAVE YOU BEEN BOTHERED BY EMOTIONAL PROBLEMS, SUCH AS FEELING ANXIOUS, DEPRESSED, OR IRRITABLE [ON A SCALE FROM 1 (NEVER) TO 5 (ALWAYS)]?: SOMETIMES
IN GENERAL, HOW WOULD YOU RATE YOUR SATISFACTION WITH YOUR SOCIAL ACTIVITIES AND RELATIONSHIPS [ON A SCALE OF 1 (POOR) TO 5 (EXCELLENT)]?: GOOD
IN GENERAL, WOULD YOU SAY YOUR QUALITY OF LIFE IS...[ON A SCALE OF 1 (POOR) TO 5 (EXCELLENT)]: FAIR
IN THE PAST 7 DAYS, HOW WOULD YOU RATE YOUR PAIN ON AVERAGE [ON A SCALE FROM 0 (NO PAIN) TO 10 (WORST IMAGINABLE PAIN)]?: 7
SUM OF RESPONSES TO QUESTIONS 3, 6, 7, & 8: 15
WHO IS THE PERSON COMPLETING THE PROMIS V1.1 SURVEY?: SELF
IN GENERAL, WOULD YOU SAY YOUR HEALTH IS...[ON A SCALE OF 1 (POOR) TO 5 (EXCELLENT)]: FAIR
TO WHAT EXTENT ARE YOU ABLE TO CARRY OUT YOUR EVERYDAY PHYSICAL ACTIVITIES SUCH AS WALKING, CLIMBING STAIRS, CARRYING GROCERIES, OR MOVING A CHAIR [ON A SCALE OF 1 (NOT AT ALL) TO 5 (COMPLETELY)]?: MODERATELY
IN GENERAL, HOW WOULD YOU RATE YOUR PHYSICAL HEALTH [ON A SCALE OF 1 (POOR) TO 5 (EXCELLENT)]?: FAIR
SUM OF RESPONSES TO QUESTIONS 2, 4, 5, & 10: 11

## 2024-04-25 ASSESSMENT — KOOS JR
BENDING TO THE FLOOR TO PICK UP OBJECT: MILD
STRAIGHTENING KNEE FULLY: MODERATE
TWISING OR PIVOTING ON KNEE: SEVERE
KOOS JR TOTAL INTERVAL SCORE: 54.84
GOING UP OR DOWN STAIRS: MODERATE
STANDING UPRIGHT: MODERATE
HOW SEVERE IS YOUR KNEE STIFFNESS AFTER FIRST WAKING IN MORNING: MODERATE
RISING FROM SITTING: MILD

## 2024-04-25 ASSESSMENT — PAIN DESCRIPTION - LOCATION: LOCATION: KNEE

## 2024-04-25 ASSESSMENT — PAIN DESCRIPTION - ORIENTATION: ORIENTATION: LEFT

## 2024-04-25 ASSESSMENT — PAIN SCALES - GENERAL: PAINLEVEL_OUTOF10: 7

## 2024-04-25 NOTE — PRE-PROCEDURE INSTRUCTIONS
surgery.    CHG may cause dry skin, but should not cause redness, rash, or intense itching. If an suspect an allergic reaction, use your own soap and shampoo for the morning of surgery and report reaction to the pre-operative nurse.      Additional Instructions:      1. If you are having any type of anesthesia, you are to have NOTHING to eat or drink after midnight the night before surgery.  This includes no gum, hard candy, mints or water.  The only exception to this is small sips of water to take the medications listed above.  No smoking or chewing tobacco after midnight.  No alcoholic beverages for 24 hours prior to surgery.    2. You may brush your teeth but do not swallow the water.    3. If you wear glasses bring a case for them if you have one.  No contacts should be worn the day of surgery.  You may also bring your hearing aids. If you have dentures, most surgical procedures involving anesthesia will require that you remove them prior to surgery.    4. If you sleep with a CPAP or BiPAP machine at home and plan on staying in the hospital overnight after surgery, please bring your machine with you.     5. Do not wear any jewelry or body piercings the day of surgery.  No nail polish on the operative extremity (arm/hand or leg/foot surgeries)     6. If you are staying overnight with us, you may bring a small bag of necessary personal items.     7. Please wear loose, comfortable clothing.  If you are potentially going to have a cast, sling, brace or bulky dressing, make sure to wear clothing that will fit over it.     8. In case of illness - If you have cold or flu like symptoms (high fever, runny nose, sore throat, cough, etc.) rash, nausea, vomiting, loose stools, and/or recent contact with someone who has a contagious disease (chicken pox, measles, COVID-19, etc.).  Please call your surgeon before coming to the hospital.     9. If you are only spending one night in the hospital please bring all your

## 2024-04-26 LAB
EKG ATRIAL RATE: 70 BPM
EKG P AXIS: 52 DEGREES
EKG P-R INTERVAL: 164 MS
EKG Q-T INTERVAL: 380 MS
EKG QRS DURATION: 82 MS
EKG QTC CALCULATION (BAZETT): 410 MS
EKG R AXIS: -14 DEGREES
EKG T AXIS: 41 DEGREES
EKG VENTRICULAR RATE: 70 BPM
EST. AVERAGE GLUCOSE BLD GHB EST-MCNC: 123 MG/DL
HBA1C MFR BLD: 5.9 % (ref 4–6)
MRSA, DNA, NASAL: NEGATIVE
SPECIMEN DESCRIPTION: NORMAL

## 2024-04-26 PROCEDURE — 93010 ELECTROCARDIOGRAM REPORT: CPT | Performed by: INTERNAL MEDICINE

## 2024-04-26 NOTE — PROGRESS NOTES
[facility] Joint Replacement Pre-surgical Assessment    Scheduled Surgery Date: 05/15/2024  Surgery Time: 1035    Surgeon: NEY  Procedure: left Total Knee    Primary Insurance Coverage MEDICARE PART A&B  Pre-op class attended YES 1100.    PCP: No primary care provider on file.  Clearance received by PCP: Yes    Anticipated Discharge Plan: home  Agency (if applicable): OPPT    Significant PMH:   Surgical History    Procedure Laterality Date Comment Source   ABDOMINAL ADHESION SURGERY  12/29/2011     BACK SURGERY  Cage and Screws x's 3    BACK SURGERY  02/24/2012     BUNIONECTOMY Right 11/19/2021 RIGHT FOOT  REVISION   BUNIONECTOMY WITH OPEN TREATMENT METATARSALS AND HARDWARE REMOVAL- [facility]          2.7 MINI FRAG SET FROM [facility]       3.0 CANNULATED SCREW SET FROM [facility]       JUDY performed by Maine Carlson DPM at [facility] OR    CHOLECYSTECTOMY  1990     COLONOSCOPY       DILATION AND CURETTAGE  11/14/2011 [facility]    ENTEROCELE REPAIR  12/29/2011     FOOT TENDON SURGERY   x's 2, Right Foot    HYSTERECTOMY (CERVIX STATUS UNKNOWN)  12/29/2011     HYSTEROSCOPY  11/14/2011 [facility]    PAULETTE AND BSO (CERVIX REMOVED)  12/29/2011 [facility]    TONSILLECTOMY   as child    TUBAL LIGATION  1992       ED Notes    ED Notes    Medical History    Diagnosis Date Comment Source   Anxiety      Arthritis      Asthma      Bipolar 1 disorder (HCC)  managed by Urvashi Marsh    Depression  2010    Diabetes (HCC)  Type 2 managed by PCP Dr BLAYNE Miranda; checks blood sugar at least twice a day  ranges in the 130's    Fibromyalgia      GERD (gastroesophageal reflux disease)      Herpes simplex without mention of complication      Hypertensive urgency 10/12/2021     IBS (irritable bowel syndrome)      Migraines  follows with neurology    Neuropathy  hands and feet    Presence of intrathecal pump  patient thinks it's a Morphine pain pump    Restless leg syndrome      Seizure (HCC) 2022 X1 episode, was attributed to BP being \"so high\" (per

## 2024-05-15 ENCOUNTER — APPOINTMENT (OUTPATIENT)
Dept: GENERAL RADIOLOGY | Age: 59
End: 2024-05-15
Attending: ORTHOPAEDIC SURGERY
Payer: MEDICARE

## 2024-05-15 ENCOUNTER — ANESTHESIA EVENT (OUTPATIENT)
Dept: OPERATING ROOM | Age: 59
End: 2024-05-15
Payer: MEDICARE

## 2024-05-15 ENCOUNTER — HOSPITAL ENCOUNTER (OUTPATIENT)
Age: 59
Discharge: HOME | End: 2024-05-15
Attending: ORTHOPAEDIC SURGERY | Admitting: ORTHOPAEDIC SURGERY
Payer: MEDICARE

## 2024-05-15 ENCOUNTER — ANESTHESIA (OUTPATIENT)
Dept: OPERATING ROOM | Age: 59
End: 2024-05-15
Payer: MEDICARE

## 2024-05-15 VITALS
HEART RATE: 78 BPM | HEIGHT: 63 IN | BODY MASS INDEX: 43.41 KG/M2 | OXYGEN SATURATION: 99 % | WEIGHT: 245 LBS | DIASTOLIC BLOOD PRESSURE: 81 MMHG | TEMPERATURE: 98.4 F | SYSTOLIC BLOOD PRESSURE: 138 MMHG | RESPIRATION RATE: 16 BRPM

## 2024-05-15 DIAGNOSIS — M17.12 PRIMARY OSTEOARTHRITIS OF LEFT KNEE: Primary | ICD-10-CM

## 2024-05-15 LAB
GLUCOSE BLD-MCNC: 107 MG/DL (ref 65–105)
GLUCOSE BLD-MCNC: 148 MG/DL (ref 65–105)

## 2024-05-15 PROCEDURE — 3700000001 HC ADD 15 MINUTES (ANESTHESIA): Performed by: ORTHOPAEDIC SURGERY

## 2024-05-15 PROCEDURE — 6360000002 HC RX W HCPCS: Performed by: ORTHOPAEDIC SURGERY

## 2024-05-15 PROCEDURE — 2709999900 HC NON-CHARGEABLE SUPPLY: Performed by: ORTHOPAEDIC SURGERY

## 2024-05-15 PROCEDURE — 64447 NJX AA&/STRD FEMORAL NRV IMG: CPT | Performed by: ANESTHESIOLOGY

## 2024-05-15 PROCEDURE — 3600000005 HC SURGERY LEVEL 5 BASE: Performed by: ORTHOPAEDIC SURGERY

## 2024-05-15 PROCEDURE — 2580000003 HC RX 258: Performed by: ORTHOPAEDIC SURGERY

## 2024-05-15 PROCEDURE — 6370000000 HC RX 637 (ALT 250 FOR IP): Performed by: ORTHOPAEDIC SURGERY

## 2024-05-15 PROCEDURE — 97116 GAIT TRAINING THERAPY: CPT

## 2024-05-15 PROCEDURE — 6370000000 HC RX 637 (ALT 250 FOR IP): Performed by: NURSE ANESTHETIST, CERTIFIED REGISTERED

## 2024-05-15 PROCEDURE — C1713 ANCHOR/SCREW BN/BN,TIS/BN: HCPCS | Performed by: ORTHOPAEDIC SURGERY

## 2024-05-15 PROCEDURE — 7100000000 HC PACU RECOVERY - FIRST 15 MIN: Performed by: ORTHOPAEDIC SURGERY

## 2024-05-15 PROCEDURE — 7100000001 HC PACU RECOVERY - ADDTL 15 MIN: Performed by: ORTHOPAEDIC SURGERY

## 2024-05-15 PROCEDURE — 97162 PT EVAL MOD COMPLEX 30 MIN: CPT

## 2024-05-15 PROCEDURE — 97166 OT EVAL MOD COMPLEX 45 MIN: CPT

## 2024-05-15 PROCEDURE — 3600000015 HC SURGERY LEVEL 5 ADDTL 15MIN: Performed by: ORTHOPAEDIC SURGERY

## 2024-05-15 PROCEDURE — A4216 STERILE WATER/SALINE, 10 ML: HCPCS | Performed by: ORTHOPAEDIC SURGERY

## 2024-05-15 PROCEDURE — 97530 THERAPEUTIC ACTIVITIES: CPT

## 2024-05-15 PROCEDURE — 2580000003 HC RX 258: Performed by: ANESTHESIOLOGY

## 2024-05-15 PROCEDURE — 6360000002 HC RX W HCPCS: Performed by: ANESTHESIOLOGY

## 2024-05-15 PROCEDURE — 73560 X-RAY EXAM OF KNEE 1 OR 2: CPT

## 2024-05-15 PROCEDURE — 2500000003 HC RX 250 WO HCPCS: Performed by: NURSE ANESTHETIST, CERTIFIED REGISTERED

## 2024-05-15 PROCEDURE — 3700000000 HC ANESTHESIA ATTENDED CARE: Performed by: ORTHOPAEDIC SURGERY

## 2024-05-15 PROCEDURE — 97535 SELF CARE MNGMENT TRAINING: CPT

## 2024-05-15 PROCEDURE — 82947 ASSAY GLUCOSE BLOOD QUANT: CPT

## 2024-05-15 PROCEDURE — 2720000010 HC SURG SUPPLY STERILE: Performed by: ORTHOPAEDIC SURGERY

## 2024-05-15 PROCEDURE — 6360000002 HC RX W HCPCS: Performed by: NURSE ANESTHETIST, CERTIFIED REGISTERED

## 2024-05-15 PROCEDURE — C1776 JOINT DEVICE (IMPLANTABLE): HCPCS | Performed by: ORTHOPAEDIC SURGERY

## 2024-05-15 PROCEDURE — 97110 THERAPEUTIC EXERCISES: CPT

## 2024-05-15 PROCEDURE — 6370000000 HC RX 637 (ALT 250 FOR IP): Performed by: ANESTHESIOLOGY

## 2024-05-15 DEVICE — ATTUNE KNEE SYSTEM TIBIAL INSERT FIXED BEARING MEDIAL STABILIZED LEFT AOX 5, 5MM
Type: IMPLANTABLE DEVICE | Site: KNEE | Status: FUNCTIONAL
Brand: ATTUNE

## 2024-05-15 DEVICE — ATTUNE KNEE SYSTEM FEMORAL POROCOAT CRUCIATE RETAINING SIZE 5 LEFT CEMENTLESS
Type: IMPLANTABLE DEVICE | Site: KNEE | Status: FUNCTIONAL
Brand: ATTUNE

## 2024-05-15 DEVICE — ATTUNE PATELLA MEDIALIZED ANATOMIC 35MM CEMENTED AOX
Type: IMPLANTABLE DEVICE | Site: KNEE | Status: FUNCTIONAL
Brand: ATTUNE

## 2024-05-15 DEVICE — ATTUNE KNEE SYSTEM TIBIAL BASE AFFIXIUM FIXED BEARING SIZE 5
Type: IMPLANTABLE DEVICE | Site: KNEE | Status: FUNCTIONAL
Brand: ATTUNE AFFIXIUM

## 2024-05-15 DEVICE — CEMENT BNE 20GM HALF DOSE PMMA VISC RADPQ FAST: Type: IMPLANTABLE DEVICE | Site: KNEE | Status: FUNCTIONAL

## 2024-05-15 RX ORDER — ALBUTEROL SULFATE 90 UG/1
AEROSOL, METERED RESPIRATORY (INHALATION) PRN
Status: DISCONTINUED | OUTPATIENT
Start: 2024-05-15 | End: 2024-05-15 | Stop reason: SDUPTHER

## 2024-05-15 RX ORDER — SODIUM CHLORIDE 0.9 % (FLUSH) 0.9 %
5-40 SYRINGE (ML) INJECTION PRN
Status: DISCONTINUED | OUTPATIENT
Start: 2024-05-15 | End: 2024-05-15 | Stop reason: HOSPADM

## 2024-05-15 RX ORDER — SODIUM CHLORIDE 9 MG/ML
INJECTION, SOLUTION INTRAVENOUS CONTINUOUS
Status: DISCONTINUED | OUTPATIENT
Start: 2024-05-15 | End: 2024-05-15 | Stop reason: HOSPADM

## 2024-05-15 RX ORDER — DIPHENHYDRAMINE HYDROCHLORIDE 50 MG/ML
12.5 INJECTION INTRAMUSCULAR; INTRAVENOUS
Status: DISCONTINUED | OUTPATIENT
Start: 2024-05-15 | End: 2024-05-15 | Stop reason: HOSPADM

## 2024-05-15 RX ORDER — FENTANYL CITRATE 50 UG/ML
25 INJECTION, SOLUTION INTRAMUSCULAR; INTRAVENOUS EVERY 5 MIN PRN
Status: DISCONTINUED | OUTPATIENT
Start: 2024-05-15 | End: 2024-05-15 | Stop reason: HOSPADM

## 2024-05-15 RX ORDER — LIDOCAINE HYDROCHLORIDE 10 MG/ML
1 INJECTION, SOLUTION EPIDURAL; INFILTRATION; INTRACAUDAL; PERINEURAL
Status: DISCONTINUED | OUTPATIENT
Start: 2024-05-16 | End: 2024-05-15 | Stop reason: HOSPADM

## 2024-05-15 RX ORDER — ONDANSETRON 2 MG/ML
4 INJECTION INTRAMUSCULAR; INTRAVENOUS EVERY 6 HOURS PRN
Status: DISCONTINUED | OUTPATIENT
Start: 2024-05-15 | End: 2024-05-15 | Stop reason: HOSPADM

## 2024-05-15 RX ORDER — SODIUM CHLORIDE 9 MG/ML
INJECTION, SOLUTION INTRAVENOUS PRN
Status: DISCONTINUED | OUTPATIENT
Start: 2024-05-15 | End: 2024-05-15 | Stop reason: HOSPADM

## 2024-05-15 RX ORDER — OXYCODONE HYDROCHLORIDE 5 MG/1
10 TABLET ORAL EVERY 4 HOURS PRN
Status: DISCONTINUED | OUTPATIENT
Start: 2024-05-15 | End: 2024-05-15 | Stop reason: HOSPADM

## 2024-05-15 RX ORDER — ROCURONIUM BROMIDE 10 MG/ML
INJECTION, SOLUTION INTRAVENOUS PRN
Status: DISCONTINUED | OUTPATIENT
Start: 2024-05-15 | End: 2024-05-15 | Stop reason: SDUPTHER

## 2024-05-15 RX ORDER — FENTANYL CITRATE 50 UG/ML
100 INJECTION, SOLUTION INTRAMUSCULAR; INTRAVENOUS ONCE
Status: COMPLETED | OUTPATIENT
Start: 2024-05-15 | End: 2024-05-15

## 2024-05-15 RX ORDER — OXYCODONE HYDROCHLORIDE 5 MG/1
5 TABLET ORAL EVERY 4 HOURS PRN
Status: DISCONTINUED | OUTPATIENT
Start: 2024-05-15 | End: 2024-05-15 | Stop reason: HOSPADM

## 2024-05-15 RX ORDER — KETOROLAC TROMETHAMINE 30 MG/ML
30 INJECTION, SOLUTION INTRAMUSCULAR; INTRAVENOUS ONCE
Status: COMPLETED | OUTPATIENT
Start: 2024-05-15 | End: 2024-05-15

## 2024-05-15 RX ORDER — HYDROMORPHONE HYDROCHLORIDE 1 MG/ML
0.5 INJECTION, SOLUTION INTRAMUSCULAR; INTRAVENOUS; SUBCUTANEOUS EVERY 5 MIN PRN
Status: DISCONTINUED | OUTPATIENT
Start: 2024-05-15 | End: 2024-05-15 | Stop reason: HOSPADM

## 2024-05-15 RX ORDER — DEXAMETHASONE SODIUM PHOSPHATE 10 MG/ML
INJECTION, SOLUTION INTRAMUSCULAR; INTRAVENOUS PRN
Status: DISCONTINUED | OUTPATIENT
Start: 2024-05-15 | End: 2024-05-15 | Stop reason: SDUPTHER

## 2024-05-15 RX ORDER — ACETAMINOPHEN 325 MG/1
650 TABLET ORAL EVERY 6 HOURS
Status: DISCONTINUED | OUTPATIENT
Start: 2024-05-15 | End: 2024-05-15 | Stop reason: HOSPADM

## 2024-05-15 RX ORDER — OXYCODONE HYDROCHLORIDE 5 MG/1
5 TABLET ORAL
Status: DISCONTINUED | OUTPATIENT
Start: 2024-05-15 | End: 2024-05-15 | Stop reason: HOSPADM

## 2024-05-15 RX ORDER — LABETALOL HYDROCHLORIDE 5 MG/ML
INJECTION, SOLUTION INTRAVENOUS PRN
Status: DISCONTINUED | OUTPATIENT
Start: 2024-05-15 | End: 2024-05-15 | Stop reason: SDUPTHER

## 2024-05-15 RX ORDER — SODIUM CHLORIDE 0.9 % (FLUSH) 0.9 %
5-40 SYRINGE (ML) INJECTION EVERY 12 HOURS SCHEDULED
Status: DISCONTINUED | OUTPATIENT
Start: 2024-05-15 | End: 2024-05-15 | Stop reason: HOSPADM

## 2024-05-15 RX ORDER — NALOXONE HYDROCHLORIDE 0.4 MG/ML
INJECTION, SOLUTION INTRAMUSCULAR; INTRAVENOUS; SUBCUTANEOUS PRN
Status: DISCONTINUED | OUTPATIENT
Start: 2024-05-15 | End: 2024-05-15 | Stop reason: HOSPADM

## 2024-05-15 RX ORDER — PROCHLORPERAZINE EDISYLATE 5 MG/ML
10 INJECTION INTRAMUSCULAR; INTRAVENOUS
Status: DISCONTINUED | OUTPATIENT
Start: 2024-05-15 | End: 2024-05-15 | Stop reason: HOSPADM

## 2024-05-15 RX ORDER — LIDOCAINE HYDROCHLORIDE 20 MG/ML
INJECTION, SOLUTION EPIDURAL; INFILTRATION; INTRACAUDAL; PERINEURAL PRN
Status: DISCONTINUED | OUTPATIENT
Start: 2024-05-15 | End: 2024-05-15 | Stop reason: SDUPTHER

## 2024-05-15 RX ORDER — MIDAZOLAM HYDROCHLORIDE 1 MG/ML
2 INJECTION INTRAMUSCULAR; INTRAVENOUS ONCE
Status: COMPLETED | OUTPATIENT
Start: 2024-05-15 | End: 2024-05-15

## 2024-05-15 RX ORDER — ONDANSETRON 4 MG/1
4 TABLET, ORALLY DISINTEGRATING ORAL EVERY 8 HOURS PRN
Status: DISCONTINUED | OUTPATIENT
Start: 2024-05-15 | End: 2024-05-15 | Stop reason: HOSPADM

## 2024-05-15 RX ORDER — METOCLOPRAMIDE HYDROCHLORIDE 5 MG/ML
10 INJECTION INTRAMUSCULAR; INTRAVENOUS
Status: DISCONTINUED | OUTPATIENT
Start: 2024-05-15 | End: 2024-05-15 | Stop reason: HOSPADM

## 2024-05-15 RX ORDER — ACETAMINOPHEN 500 MG
1000 TABLET ORAL ONCE
Status: COMPLETED | OUTPATIENT
Start: 2024-05-15 | End: 2024-05-15

## 2024-05-15 RX ORDER — OXYCODONE HYDROCHLORIDE 5 MG/1
5 TABLET ORAL EVERY 6 HOURS PRN
Qty: 9 TABLET | Refills: 0 | Status: SHIPPED | OUTPATIENT
Start: 2024-05-15 | End: 2024-05-18

## 2024-05-15 RX ORDER — OXYCODONE HYDROCHLORIDE 10 MG/1
10 TABLET ORAL EVERY 4 HOURS PRN
Qty: 6 TABLET | Refills: 0 | Status: SHIPPED | OUTPATIENT
Start: 2024-05-15 | End: 2024-05-18

## 2024-05-15 RX ORDER — ONDANSETRON 2 MG/ML
INJECTION INTRAMUSCULAR; INTRAVENOUS PRN
Status: DISCONTINUED | OUTPATIENT
Start: 2024-05-15 | End: 2024-05-15 | Stop reason: SDUPTHER

## 2024-05-15 RX ORDER — SODIUM CHLORIDE, SODIUM LACTATE, POTASSIUM CHLORIDE, CALCIUM CHLORIDE 600; 310; 30; 20 MG/100ML; MG/100ML; MG/100ML; MG/100ML
INJECTION, SOLUTION INTRAVENOUS CONTINUOUS
Status: DISCONTINUED | OUTPATIENT
Start: 2024-05-15 | End: 2024-05-15 | Stop reason: HOSPADM

## 2024-05-15 RX ORDER — PROPOFOL 10 MG/ML
INJECTION, EMULSION INTRAVENOUS PRN
Status: DISCONTINUED | OUTPATIENT
Start: 2024-05-15 | End: 2024-05-15 | Stop reason: SDUPTHER

## 2024-05-15 RX ORDER — ROPIVACAINE HYDROCHLORIDE 5 MG/ML
INJECTION, SOLUTION EPIDURAL; INFILTRATION; PERINEURAL
Status: DISCONTINUED | OUTPATIENT
Start: 2024-05-15 | End: 2024-05-15 | Stop reason: SDUPTHER

## 2024-05-15 RX ORDER — TRANEXAMIC ACID 100 MG/ML
INJECTION, SOLUTION INTRAVENOUS PRN
Status: DISCONTINUED | OUTPATIENT
Start: 2024-05-15 | End: 2024-05-15 | Stop reason: SDUPTHER

## 2024-05-15 RX ORDER — FENTANYL CITRATE 50 UG/ML
INJECTION, SOLUTION INTRAMUSCULAR; INTRAVENOUS PRN
Status: DISCONTINUED | OUTPATIENT
Start: 2024-05-15 | End: 2024-05-15 | Stop reason: SDUPTHER

## 2024-05-15 RX ORDER — TRANEXAMIC ACID 100 MG/ML
INJECTION, SOLUTION INTRAVENOUS
Status: COMPLETED
Start: 2024-05-15 | End: 2024-05-15

## 2024-05-15 RX ADMIN — DEXAMETHASONE SODIUM PHOSPHATE 10 MG: 10 INJECTION, SOLUTION INTRAMUSCULAR; INTRAVENOUS at 10:54

## 2024-05-15 RX ADMIN — SUGAMMADEX 200 MG: 100 INJECTION, SOLUTION INTRAVENOUS at 13:20

## 2024-05-15 RX ADMIN — PROPOFOL 200 MG: 10 INJECTION, EMULSION INTRAVENOUS at 10:46

## 2024-05-15 RX ADMIN — HYDROMORPHONE HYDROCHLORIDE 0.5 MG: 1 INJECTION, SOLUTION INTRAMUSCULAR; INTRAVENOUS; SUBCUTANEOUS at 13:46

## 2024-05-15 RX ADMIN — TRANEXAMIC ACID 1000 MG: 100 INJECTION, SOLUTION INTRAVENOUS at 11:57

## 2024-05-15 RX ADMIN — Medication 2000 MG: at 10:55

## 2024-05-15 RX ADMIN — SODIUM CHLORIDE, POTASSIUM CHLORIDE, SODIUM LACTATE AND CALCIUM CHLORIDE: 600; 310; 30; 20 INJECTION, SOLUTION INTRAVENOUS at 09:04

## 2024-05-15 RX ADMIN — KETOROLAC TROMETHAMINE 30 MG: 30 INJECTION, SOLUTION INTRAMUSCULAR at 17:47

## 2024-05-15 RX ADMIN — FENTANYL CITRATE 100 MCG: 50 INJECTION INTRAMUSCULAR; INTRAVENOUS at 10:23

## 2024-05-15 RX ADMIN — OXYCODONE HYDROCHLORIDE 10 MG: 5 TABLET ORAL at 17:39

## 2024-05-15 RX ADMIN — ROCURONIUM BROMIDE 50 MG: 50 INJECTION INTRAVENOUS at 10:46

## 2024-05-15 RX ADMIN — ACETAMINOPHEN 650 MG: 325 TABLET ORAL at 17:38

## 2024-05-15 RX ADMIN — LABETALOL HYDROCHLORIDE 10 MG: 5 INJECTION INTRAVENOUS at 11:54

## 2024-05-15 RX ADMIN — LABETALOL HYDROCHLORIDE 5 MG: 5 INJECTION INTRAVENOUS at 11:42

## 2024-05-15 RX ADMIN — MIDAZOLAM 2 MG: 1 INJECTION INTRAMUSCULAR; INTRAVENOUS at 10:23

## 2024-05-15 RX ADMIN — ALBUTEROL SULFATE 8 PUFF: 90 AEROSOL, METERED RESPIRATORY (INHALATION) at 12:39

## 2024-05-15 RX ADMIN — TRANEXAMIC ACID 1000 MG: 100 INJECTION, SOLUTION INTRAVENOUS at 11:00

## 2024-05-15 RX ADMIN — ONDANSETRON 4 MG: 2 INJECTION INTRAMUSCULAR; INTRAVENOUS at 13:10

## 2024-05-15 RX ADMIN — ROPIVACAINE HYDROCHLORIDE 20 ML: 5 INJECTION EPIDURAL; INFILTRATION; PERINEURAL at 10:10

## 2024-05-15 RX ADMIN — FENTANYL CITRATE 50 MCG: 50 INJECTION INTRAMUSCULAR; INTRAVENOUS at 11:19

## 2024-05-15 RX ADMIN — SODIUM CHLORIDE, POTASSIUM CHLORIDE, SODIUM LACTATE AND CALCIUM CHLORIDE: 600; 310; 30; 20 INJECTION, SOLUTION INTRAVENOUS at 16:45

## 2024-05-15 RX ADMIN — FENTANYL CITRATE 50 MCG: 50 INJECTION INTRAMUSCULAR; INTRAVENOUS at 13:36

## 2024-05-15 RX ADMIN — LABETALOL HYDROCHLORIDE 5 MG: 5 INJECTION INTRAVENOUS at 13:19

## 2024-05-15 RX ADMIN — LIDOCAINE HYDROCHLORIDE 75 MG: 20 INJECTION, SOLUTION EPIDURAL; INFILTRATION; INTRACAUDAL; PERINEURAL at 10:46

## 2024-05-15 RX ADMIN — FENTANYL CITRATE 50 MCG: 50 INJECTION INTRAMUSCULAR; INTRAVENOUS at 12:04

## 2024-05-15 RX ADMIN — FENTANYL CITRATE 50 MCG: 50 INJECTION INTRAMUSCULAR; INTRAVENOUS at 10:46

## 2024-05-15 RX ADMIN — ACETAMINOPHEN 1000 MG: 500 TABLET ORAL at 09:27

## 2024-05-15 RX ADMIN — HYDROMORPHONE HYDROCHLORIDE 0.5 MG: 1 INJECTION, SOLUTION INTRAMUSCULAR; INTRAVENOUS; SUBCUTANEOUS at 14:13

## 2024-05-15 ASSESSMENT — PAIN DESCRIPTION - ORIENTATION
ORIENTATION: LEFT

## 2024-05-15 ASSESSMENT — PAIN DESCRIPTION - LOCATION
LOCATION: KNEE

## 2024-05-15 ASSESSMENT — PAIN SCALES - GENERAL
PAINLEVEL_OUTOF10: 9
PAINLEVEL_OUTOF10: 10
PAINLEVEL_OUTOF10: 9
PAINLEVEL_OUTOF10: 10
PAINLEVEL_OUTOF10: 8
PAINLEVEL_OUTOF10: 10
PAINLEVEL_OUTOF10: 9

## 2024-05-15 ASSESSMENT — PAIN DESCRIPTION - DESCRIPTORS
DESCRIPTORS: SHOOTING;SHARP;ACHING
DESCRIPTORS: SHARP;STABBING
DESCRIPTORS: ACHING;SHARP;STABBING
DESCRIPTORS: SHARP;STABBING

## 2024-05-15 ASSESSMENT — PAIN - FUNCTIONAL ASSESSMENT
PAIN_FUNCTIONAL_ASSESSMENT: PREVENTS OR INTERFERES SOME ACTIVE ACTIVITIES AND ADLS
PAIN_FUNCTIONAL_ASSESSMENT: 0-10
PAIN_FUNCTIONAL_ASSESSMENT: PREVENTS OR INTERFERES SOME ACTIVE ACTIVITIES AND ADLS

## 2024-05-15 NOTE — PROGRESS NOTES
Orthopedic Coordinator Note    Patient s/p left total Knee replacement on 05/15/2024 WITH DR. BREWSTER.    The following appointments are currently scheduled:    Post-op with surgeon 05/30/2024 AT 1015.    Physical Therapy NONE-PT RENTED CPM FROM [facility].    PT HAS A FWW.    DVT Prophylaxis: 10MG XARELTO DAILY X 21 DAYS POSTOP. PT TO START XARELTO POD1 05/16/2024.    PT HAS XARELTO AND PERCOCET ESCRIBED TO [facility] FOR PICK POSTOP.    PT CAME TO JOINT CLASS 04/25/2024.    PT IS A SDD.      Any questions please contact Celestino Hankins RN, MSN  [phone]      Electronically signed by: CELESTINO HANKINS RN on 5/15/2024 at 8:56 AM

## 2024-05-15 NOTE — PROGRESS NOTES
Total joint patient has met the below criteria for a safe same day discharge.    Patient has voided before discharge.  PT has cleared pt for safety before discharge.  Pain controlled with PO pain meds.  Pt able to eat without nausea and vomiting.  DME delivered to bedside before discharge (if needed please specify which DME).  RXs filled and delivered to bedside by [facility].  Incentive spirometer/C&DB used correctly and sent home with pt.  VSS/MAP WNL.   Pt discharged to home per wc. No distress noted

## 2024-05-15 NOTE — DISCHARGE INSTRUCTIONS
blood.  The skin around your dressing looks more red or irritated than usual even after you have rested and elevated your leg.  The incisional wound has any odor/smell.  The dressing is coming loose.  The alarm display will not stop flashing.    Showering with the ALEJANDRO dressing  The dressing on top of the surgical incision site is water resistant.  You can shower and wash with the dressing in place, as long as you take care not to expose the dressing directly to jets of water and do not soak it.  Soaking the dressing will cause it to fall off.  You MAY shower - as long as you disconnect the pump from the dressing first.  Press the orange button to pause the therapy.    Unscrew the two connector parts.    The ALEJANDRO pump should not be exposed to jets of water.  Put the pump on a counter outside the shower area and away from the source of water.  Make sure the tubing that is still attached to the dressing is facing downward, away from the water source, so that water cannot enter the tube.  After you have dried off and blotted the dressing site and area around the dressing, reconnect the tubing to the pump.  Turn the pump back on.    PLEASE NOTE:The batteries for the ALEJANDRO dressing will stop negative pressure after 7 days, no need to replace th batteries. Dr. Louise wants the patient to cut the flat tubing about 1\" from the patient's dressing on the hop or knee and place a piece of tape over the cut end attached to the dressing still on the patient. The battery pack and tubing can all be thrown in the trash as they are not recyclable.

## 2024-05-15 NOTE — ANESTHESIA POST-OP
Patient transferred to PACU PHASE II while patient waiting for bed placement.     PT/OT at bedside working with patient at this time.

## 2024-05-15 NOTE — BRIEF OP NOTE
Brief Postoperative Note      Patient: Lyric Faulkner  YOB: 1965  MRN: [id number]    Date of Procedure: 5/15/2024    Pre-Op Diagnosis Codes:     * Primary localized osteoarthritis of left knee [M17.12]    Post-Op Diagnosis: Same       Procedure(s):  KNEE TOTAL ARTHROPLASTY    Surgeon(s):  John Paul Louise MD    Assistant:  Surgical Assistant: Cas Gonzalez    Anesthesia: General    Estimated Blood Loss (mL): 600    Complications: None    Specimens:   * No specimens in log *    Implants:  Implant Name Type Inv. Item Serial No.  Lot No. LRB No. Used Action   CEMENT BNE 20GM HALF DOSE PMMA VISC RADPQ FAST - [id number]  CEMENT BNE 20GM HALF DOSE PMMA VISC RADPQ FAST  [facility] [place] [employer] [facility] [facility] [id number] Left 1 Implanted   ATTUNE FEM POR CR LT SZ 5 - [id number]  ATTUNE FEM POR CR LT SZ 5  [facility] [place] [employer] [id number] Left 1 Implanted   BEARING TIB FIX 5 KNEE BASE POROUS ATTUNE AFFIXIUM - [id number]  BEARING TIB FIX 5 KNEE BASE POROUS ATTUNE AFFIXIUM  [facility] [place] [employer] [id number] Left 1 Implanted   COMPONENT PAT [id number] KNEE POLY RONY MEDIALIZED LASHONDA ATTUNE - [id number]  COMPONENT PAT [id number] KNEE POLY RONY MEDIALIZED LASHONDA ATTUNE  [facility] [employer] [facility] [id number] Left 1 Implanted   INSERT TIB FIX BEAR 5 5 MM LT MEDL KNEE STBL AOX ATTUNE - [id number]  INSERT TIB FIX BEAR 5 5 MM LT MEDL KNEE STBL AOX ATTUNE  [facility] [place] [employer] [id number] Left 1 Implanted         Drains: * No LDAs found *    Findings:  Infection Present At Time Of Surgery (PATOS) (choose all levels that have infection present):  No infection present  Other Findings: Severe OA with patellofemoral and medial compartment focus    Electronically signed by John Paul Louise MD on 5/15/2024 at 2:25 PM

## 2024-05-15 NOTE — ANESTHESIA POSTPROCEDURE EVALUATION
Department of Anesthesiology  Postprocedure Note    Patient: Lyric Faulkner  MRN: [id number]  YOB: 1965  Date of evaluation: 5/15/2024    Procedure Summary       Date: 05/15/24 Room / Location: [address]    Anesthesia Start: 1041 Anesthesia Stop: 1338    Procedure: KNEE TOTAL ARTHROPLASTY (Left: Knee) Diagnosis:       Primary localized osteoarthritis of left knee      (Primary localized osteoarthritis of left knee [M17.12])    Surgeons: John Paul Louise MD Responsible Provider: South Santana MD    Anesthesia Type: General ASA Status: 3            Anesthesia Type: General    Sandeep Phase I: Sandeep Score: 9    Sandeep Phase II:      Anesthesia Post Evaluation    Patient location during evaluation: PACU  Patient participation: complete - patient participated  Level of consciousness: awake and alert  Airway patency: patent  Nausea & Vomiting: no nausea and no vomiting  Cardiovascular status: hemodynamically stable  Respiratory status: acceptable  Hydration status: euvolemic  Pain management: adequate    No notable events documented.

## 2024-05-15 NOTE — PROGRESS NOTES
Patient tolerated block well. Side rails up x2. Call light within reach bed locked in low position.

## 2024-05-15 NOTE — OP NOTE
Operative Note      Patient: Lyric Faulkner  YOB: 1965  MRN: [id number]    Date of Procedure: 5/15/2024    Pre-Op Diagnosis Codes:     * Primary localized osteoarthritis of left knee [M17.12]    Post-Op Diagnosis: Same       Procedure(s):  KNEE TOTAL ARTHROPLASTY    Surgeon(s):  John Paul Louise MD    Assistant:   Surgical Assistant: Cas Gonzalez    Anesthesia: General    Estimated Blood Loss (mL): 600    Complications: None    Specimens:   * No specimens in log *    Implants:  Implant Name Type Inv. Item Serial No.  Lot No. LRB No. Used Action   CEMENT BNE 20GM HALF DOSE PMMA VISC RADPQ FAST - [id number]  CEMENT BNE 20GM HALF DOSE PMMA VISC RADPQ FAST  [facility] [id number] Left 1 Implanted   ATTUNE FEM POR CR LT SZ 5 - [id number]  ATTUNE FEM POR CR LT SZ 5  [facility] [id number] Left 1 Implanted   BEARING TIB FIX 5 KNEE BASE POROUS ATTUNE AFFIXIUM - [id number]  BEARING TIB FIX 5 KNEE BASE POROUS ATTUNE AFFIXIUM  [facility] [id number] Left 1 Implanted   COMPONENT PAT [id number] KNEE POLY RONY MEDIALIZED LASHONDA ATTUNE - [id number]  COMPONENT PAT [id number] KNEE POLY RONY MEDIALIZED LASHONDA ATTUNE  [employer] [facility] [id number] Left 1 Implanted   INSERT TIB FIX BEAR 5 5 MM LT MEDL KNEE STBL AOX ATTUNE - [id number]  INSERT TIB FIX BEAR 5 5 MM LT MEDL KNEE STBL AOX ATTUNE  [facility] [id number] Left 1 Implanted         Drains: * No LDAs found *    Findings:  Infection Present At Time Of Surgery (PATOS) (choose all levels that have infection present):  No infection present  Other Findings: Severe OA with patellofemoral and medial compartment focus      Detailed Description of Procedure:     Indications  This patient has had severe pain and arthritis of the left knee, unresponsive to conservative treatment. It is now recommended that the patient have total knee replacement. After discussion of the particulars of surgery,

## 2024-05-15 NOTE — PROGRESS NOTES
Physical Therapy  Facility/Department: [facility] SURG  Physical Therapy Initial Assessment    Name: Lyric Faulkner  : 1965  MRN: [id number]  Date of Service: 5/15/2024    Discharge Recommendations:       Due to recent hospitalization and medical condition, pt would benefit from additional intermittent skilled therapy at time of discharge.  Please refer to the AM-PAC score for current functional status.      L TKA by Dr. Louise 5/15/24      Patient Diagnosis(es): The encounter diagnosis was Primary osteoarthritis of left knee.  Past Medical History:  has a past medical history of Anxiety, Arthritis, Asthma, Bipolar 1 disorder (HCC), Depression, Diabetes (HCC), Fibromyalgia, GERD (gastroesophageal reflux disease), Herpes simplex without mention of complication, Hypertensive urgency, IBS (irritable bowel syndrome), Migraines, Neuropathy, Presence of intrathecal pump, Restless leg syndrome, Seizure ([facility]), Sleep apnea, and Under care of team.  Past Surgical History:  has a past surgical history that includes Cholecystectomy (); Tonsillectomy; Tubal ligation (); back surgery (Cage and Screws); Foot Tendon Surgery; Dilation & curettage (2011); hysteroscopy (2011); Total abdominal hysterectomy w/ bilateral salpingoophorectomy (2011); Hysterectomy (2011); Enterocele repair (2011); Abdominal adhesion surgery (2011); back surgery (2012); Colonoscopy; and Bunionectomy (Right, 2021).    Assessment   Body Structures, Functions, Activity Limitations Requiring Skilled Therapeutic Intervention: Decreased functional mobility   Assessment: Pt. able to complete all tasks for d/c home same day L TKA by Dr. Louise. See Goals section  Therapy Prognosis: Excellent  Decision Making: Medium Complexity  Requires PT Follow-Up: No  Activity Tolerance  Activity Tolerance: Patient limited by pain     Plan   Physical Therapy Plan  General Plan: Discharge with evaluation only  Safety

## 2024-05-15 NOTE — PROGRESS NOTES
Pt admitted to room from PACU  Oriented to room and call light/tv controls.  Bed in lowest position, wheels locked, 2/4 side rails up  Call light in reach, room free of clutter, adequate lighting provided.

## 2024-05-15 NOTE — ANESTHESIA PRE PROCEDURE
Department of Anesthesiology  Preprocedure Note       Name:  Lyric Faulkner   Age:  58 y.o.  :  1965                                          MRN:  [id number]         Date:  5/15/2024      Surgeon: Surgeon(s):  John Paul Louise MD    Procedure: Procedure(s):  KNEE TOTAL ARTHROPLASTY    Medications prior to admission:   Prior to Admission medications    Medication Sig Start Date End Date Taking? Authorizing Provider   escitalopram (LEXAPRO) 10 MG tablet Take 1 tablet by mouth daily 24   Marcia Hall MD   traZODone (DESYREL) 150 MG tablet Take 0.5 tablets by mouth nightly 4/15/24   Marcia Hall MD   hydrALAZINE (APRESOLINE) 25 MG tablet Take 1 tablet by mouth 3 times daily    Marcia Hall MD   propranolol (INDERAL) 40 MG tablet Take 1 tablet by mouth 2 times daily 24   Marcia Hall MD   metFORMIN (GLUCOPHAGE) 500 MG tablet Take 1 tablet by mouth 2 times daily (with meals)    Marcia Hall MD   famotidine (PEPCID) 20 MG tablet Take 1 tablet by mouth nightly 4/15/24   Marcia Hall MD   OXcarbazepine (TRILEPTAL) 150 MG tablet Take 1 tablet by mouth 2 times daily 24   Marcia Hall MD   atorvastatin (LIPITOR) 40 MG tablet Take 1 tablet by mouth daily 10/31/22   Marcia Hall MD   NIFEdipine (ADALAT CC) 30 MG extended release tablet Take 1 tablet by mouth daily 10/7/22   Marcia Hall MD   topiramate (TOPAMAX) 50 MG tablet Take 2 tablets by mouth 2 times daily 20   Marcia Hall MD   SUMAtriptan (IMITREX) 50 MG tablet Take 1 tablet by mouth once as needed for Migraine    Marcia Hall MD   ondansetron (ZOFRAN) 8 MG tablet Take 1 tablet by mouth 2 times daily as needed for Nausea    Marcia Hall MD   pramipexole (MIRAPEX) 0.125 MG tablet Take 1 tablet by mouth nightly 24   Marcia Hall MD   fexofenadine-pseudoephedrine (ALLEGRA-D 12HR)  MG per extended release tablet Take 1

## 2024-05-15 NOTE — ANESTHESIA PROCEDURE NOTES
Peripheral Block    Patient location during procedure: pre-op  Reason for block: post-op pain management and at surgeon's request  Start time: 5/15/2024 10:10 AM  End time: 5/15/2024 10:24 AM  Staffing  Performed: anesthesiologist   Anesthesiologist: South Santana MD  Performed by: South Santana MD  Authorized by: South Santana MD    Preanesthetic Checklist  Completed: patient identified, IV checked, site marked, risks and benefits discussed, surgical/procedural consents, equipment checked, pre-op evaluation, timeout performed, anesthesia consent given, oxygen available, monitors applied/VS acknowledged, fire risk safety assessment completed and verbalized and blood product R/B/A discussed and consented  Peripheral Block   Patient position: supine  Prep: ChloraPrep  Provider prep: mask and sterile gloves  Patient monitoring: cardiac monitor, continuous pulse ox, IV access and oxygen  Block type: Saphenous  Laterality: left  Injection technique: single-shot  Guidance: ultrasound guided    Needle   Needle type: insulated echogenic nerve stimulator needle   Needle localization: ultrasound guidance  Assessment   Injection assessment: negative aspiration for heme, no paresthesia on injection, local visualized surrounding nerve on ultrasound and no intravascular symptoms  Paresthesia pain: none  Slow fractionated injection: yes  Hemodynamics: stable  Outcomes: uncomplicated and patient tolerated procedure well    Medications Administered  ropivacaine (NAROPIN) injection 0.5% - Perineural   20 mL - 5/15/2024 10:10:00 AM

## 2024-05-16 NOTE — PROGRESS NOTES
Occupational Therapy  Facility/Department: [facility] MED SURG  Occupational Therapy Initial Assessment    Name: Lyric Faulkner  : 1965  MRN: [id number]  Date of Service: 5/15/2024    [facility] 5/15/2024    RN reports patient is medically stable for therapy treatment this date.    Chart reviewed prior to treatment and patient is agreeable for therapy.  All lines intact and patient positioned comfortably at end of treatment.  All patient needs addressed prior to ending therapy session.               Patient Diagnosis(es): The encounter diagnosis was Primary osteoarthritis of left knee.  Past Medical History:  has a past medical history of Anxiety, Arthritis, Asthma, Bipolar 1 disorder (HCC), Depression, Diabetes (HCC), Fibromyalgia, GERD (gastroesophageal reflux disease), Herpes simplex without mention of complication, Hypertensive urgency, IBS (irritable bowel syndrome), Migraines, Neuropathy, Presence of intrathecal pump, Restless leg syndrome, Seizure ([facility]), Sleep apnea, and Under care of team.  Past Surgical History:  has a past surgical history that includes Cholecystectomy (); Tonsillectomy; Tubal ligation (); back surgery (Cage and Screws); Foot Tendon Surgery; Dilation & curettage (2011); hysteroscopy (2011); Total abdominal hysterectomy w/ bilateral salpingoophorectomy (2011); Hysterectomy (2011); Enterocele repair (2011); Abdominal adhesion surgery (2011); back surgery (2012); Colonoscopy; and Bunionectomy (Right, 2021).           Assessment   Performance deficits / Impairments: Decreased functional mobility ;Decreased ADL status;Decreased strength;Decreased safe awareness;Decreased balance  Assessment: following 2nd session pt ok for d/c home  Prognosis: Good  Decision Making: Medium Complexity  REQUIRES OT FOLLOW-UP: Yes  Activity Tolerance  Activity Tolerance: Patient limited by pain;Patient Tolerated treatment well  Activity Tolerance Comments: 2nd

## (undated) DEVICE — Device

## (undated) DEVICE — 3M™ IOBAN™ 2 ANTIMICROBIAL INCISE DRAPE 6650EZ: Brand: IOBAN™ 2

## (undated) DEVICE — SUTURE ABSORBABLE MONOFILAMENT 2-0 CT-1 24 CM 36 MM VIO PDS+

## (undated) DEVICE — SKIN PREP TRAY W/CHG: Brand: MEDLINE INDUSTRIES, INC.

## (undated) DEVICE — SOLUTION IV IRRIG 500ML 0.9% SODIUM CHL 2F7123

## (undated) DEVICE — SUTURE VCRL SZ 4-0 L27IN ABSRB UD L19MM PS-2 3/8 CIR PRIM J426H

## (undated) DEVICE — DUP USE 394429 BLADE SAW SAG OSCIL MED NAR 5.5MM

## (undated) DEVICE — PICO 7 10CM X 30CM: Brand: PICO™ 7

## (undated) DEVICE — SOLUTION IV 250ML 0.9% SOD CHL PH 5 INJ USP VIAFLX PLAS

## (undated) DEVICE — CONTAINER,SPECIMEN,OR STERILE,4OZ: Brand: MEDLINE

## (undated) DEVICE — SILVER-COATED ANTIMICROBIAL BARRIER DRESSING: Brand: ACTICOAT FLEX3 4" X 4"

## (undated) DEVICE — SYRINGE MED 20ML STD CLR PLAS LUERLOCK TIP N CTRL DISP

## (undated) DEVICE — GLOVE SURG SZ 7 CRM LTX FREE POLYISOPRENE POLYMER BEAD ANTI

## (undated) DEVICE — SMALL TEAR CROSS CUT RASP (11.0 X 5.0MM)

## (undated) DEVICE — PRECISION THIN (9.0 X 0.38 X 25.0MM)

## (undated) DEVICE — SST TWIST DRILL, AO TYPE, 2.7MM DIA. X 75MM: Brand: MICROAIRE®

## (undated) DEVICE — SUTURE MCRYL SZ 4-0 L27IN ABSRB UD L19MM PS-2 1/2 CIR PRIM Y426H

## (undated) DEVICE — BIPOLAR SEALER 23-112-1 AQM 6.0: Brand: AQUAMANTYS ®

## (undated) DEVICE — BLANKET WRM W29.9XL79.1IN UP BODY FORC AIR MISTRAL-AIR

## (undated) DEVICE — SUTURE STRATAFIX SYMMETRIC PDS + SZ 1 L18IN ABSRB VLT L48MM SXPP1A400

## (undated) DEVICE — 450 ML BOTTLE OF 0.05% CHLORHEXIDINE GLUCONATE IN 99.95% STERILE WATER FOR IRRIGATION, USP AND APPLICATOR.: Brand: IRRISEPT ANTIMICROBIAL WOUND LAVAGE

## (undated) DEVICE — STRIP SKIN CLSR W0.25XL4IN WHT SPUNBOUND FBR NYL HI ADH

## (undated) DEVICE — SUTURE 2-0 VCRL CTD FS-1 J443H

## (undated) DEVICE — BLADE SAW W12.5XL70MM THK0.8MM CUT THK1.12MM S STL RECIP

## (undated) DEVICE — 4-PORT MANIFOLD: Brand: NEPTUNE 2

## (undated) DEVICE — SOLUTION IV 1000ML 0.9% SOD CHL PH 5 INJ USP VIAFLX PLAS

## (undated) DEVICE — GOWN,AURORA,NONRNF,XL,30/CS: Brand: MEDLINE

## (undated) DEVICE — INTENDED FOR TISSUE SEPARATION, AND OTHER PROCEDURES THAT REQUIRE A SHARP SURGICAL BLADE TO PUNCTURE OR CUT.: Brand: BARD-PARKER ® CARBON RIB-BACK BLADES

## (undated) DEVICE — APPLICATOR MEDICATED 26 CC SOLUTION HI LT ORNG CHLORAPREP

## (undated) DEVICE — GLOVE SURG SZ 65 CRM LTX FREE POLYISOPRENE POLYMER BEAD ANTI

## (undated) DEVICE — NEEDLE HYPO 25GA L1.5IN BLU POLYPR HUB S STL REG BVL STR

## (undated) DEVICE — BANDAGE COBAN 4 IN COMPR W4INXL5YD FOAM COHESIVE QUIK STK SELF ADH SFT

## (undated) DEVICE — SST TWIST DRILL, AO TYPE, 2MM DIA. X 75MM: Brand: MICROAIRE®

## (undated) DEVICE — 3M™ STERI-STRIP™ COMPOUND BENZOIN TINCTURE 40 BAGS/CARTON 4 CARTONS/CASE C1544: Brand: 3M™ STERI-STRIP™

## (undated) DEVICE — GLOVE SURG SZ 8 L12IN FNGR THK79MIL GRN LTX FREE

## (undated) DEVICE — PIN DRL THRD HDLSS SIG

## (undated) DEVICE — GUIDEPIN ORTH THRD HI PERF HD SIG

## (undated) DEVICE — GLOVE SURG SZ 8 CRM LTX FREE POLYISOPRENE POLYMER BEAD ANTI

## (undated) DEVICE — BLADE,CARBON-STEEL,15,STRL,DISPOSABLE,TB: Brand: MEDLINE

## (undated) DEVICE — DECANTER FLD 9IN ST BG FOR ASEP TRNSF OF FLD

## (undated) DEVICE — DRAPE,REIN 53X77,STERILE: Brand: MEDLINE

## (undated) DEVICE — SUTURE STRATAFIX SPRL SZ 3-0 L5IN ABSRB UD FS L26MM 3/8 CIR SXMP2B411

## (undated) DEVICE — 2108 SERIES SAGITTAL BLADE, NO OFFSET  (18.6 X 1.24 X 105MM)

## (undated) DEVICE — PADDING CAST W6INXL4YD ST COT RAYON MICROPLEATED HIGHLY

## (undated) DEVICE — BLADE ES L6IN ELASTOMERIC COAT EXT DURABLE BEND UPTO 90DEG

## (undated) DEVICE — NEPTUNE E-SEP 165MM SUCTION SLEEVE: Brand: NEPTUNE E-SEP

## (undated) DEVICE — TOWEL,OR,DSP,ST,BLUE,DLX,XR,4/PK,20PK/CS: Brand: MEDLINE

## (undated) DEVICE — STRYKER PERFORMANCE SERIES SAGITTAL BLADE: Brand: STRYKER PERFORMANCE SERIES